# Patient Record
Sex: MALE | Race: OTHER | Employment: OTHER | URBAN - METROPOLITAN AREA
[De-identification: names, ages, dates, MRNs, and addresses within clinical notes are randomized per-mention and may not be internally consistent; named-entity substitution may affect disease eponyms.]

---

## 2022-08-03 ENCOUNTER — TELEPHONE (OUTPATIENT)
Dept: SURGERY | Facility: CLINIC | Age: 77
End: 2022-08-03

## 2022-08-03 NOTE — TELEPHONE ENCOUNTER
I called wife Vikram Velazquez and discussed that we have not received records. I verified office fax number and rec she speak to medical records to have it resent. I also discussed that she can have them mailed to their house as well. She asked if our patients do hyperbaric therapy and I discussed that we do refer patients for this. She verbalized understanding and all questions were answered.

## 2022-08-03 NOTE — TELEPHONE ENCOUNTER
Per wife asking if the office received the medical records sent about 3 weeks ago by [facility].  Please advise

## 2022-08-06 ENCOUNTER — HOSPITAL ENCOUNTER (INPATIENT)
Facility: HOSPITAL | Age: 77
LOS: 20 days | Discharge: HOME | End: 2022-08-27
Attending: EMERGENCY MEDICINE | Admitting: HOSPITALIST
Payer: MEDICARE

## 2022-08-06 ENCOUNTER — HOSPITAL ENCOUNTER (INPATIENT)
Facility: HOSPITAL | Age: 77
LOS: 20 days | Discharge: HOME HEALTH | DRG: 654 | End: 2022-08-27
Attending: EMERGENCY MEDICINE | Admitting: HOSPITALIST
Payer: MEDICARE

## 2022-08-06 DIAGNOSIS — R33.9 URINARY RETENTION: ICD-10-CM

## 2022-08-06 DIAGNOSIS — R31.9 URINARY TRACT INFECTION WITH HEMATURIA, SITE UNSPECIFIED: ICD-10-CM

## 2022-08-06 DIAGNOSIS — R31.0 GROSS HEMATURIA: Primary | ICD-10-CM

## 2022-08-06 DIAGNOSIS — N39.0 URINARY TRACT INFECTION WITH HEMATURIA, SITE UNSPECIFIED: ICD-10-CM

## 2022-08-06 LAB
BILIRUB UR QL STRIP.AUTO: NEGATIVE
GLUCOSE UR STRIP.AUTO-MCNC: NEGATIVE MG/DL
KETONES UR STRIP.AUTO-MCNC: NEGATIVE MG/DL
NITRITE UR QL STRIP.AUTO: NEGATIVE
PH UR STRIP.AUTO: 6.5 [PH] (ref 5–8)
PROT UR STRIP.AUTO-MCNC: >=300 MG/DL
RBC #/AREA URNS AUTO: >10 /HPF
SP GR UR STRIP.AUTO: 1.02 (ref 1–1.03)
UROBILINOGEN UR STRIP.AUTO-MCNC: 0.2 MG/DL
WBC #/AREA URNS AUTO: >50 /HPF
WBC CLUMPS UR QL AUTO: PRESENT /HPF

## 2022-08-06 RX ORDER — LIDOCAINE HYDROCHLORIDE 20 MG/ML
JELLY TOPICAL
Status: COMPLETED
Start: 2022-08-06 | End: 2022-08-06

## 2022-08-06 RX ORDER — LIDOCAINE HYDROCHLORIDE 20 MG/ML
10 JELLY TOPICAL ONCE
Status: COMPLETED | OUTPATIENT
Start: 2022-08-06 | End: 2022-08-06

## 2022-08-06 RX ORDER — ACETAMINOPHEN 500 MG
1000 TABLET ORAL ONCE
Status: COMPLETED | OUTPATIENT
Start: 2022-08-06 | End: 2022-08-07

## 2022-08-07 PROBLEM — N30.40 RADIATION CYSTITIS: Status: ACTIVE | Noted: 2022-08-07

## 2022-08-07 PROBLEM — R33.9 URINARY RETENTION: Status: ACTIVE | Noted: 2022-08-07

## 2022-08-07 PROBLEM — N39.0 URINARY TRACT INFECTION WITH HEMATURIA, SITE UNSPECIFIED: Status: ACTIVE | Noted: 2022-08-07

## 2022-08-07 PROBLEM — R31.9 URINARY TRACT INFECTION WITH HEMATURIA, SITE UNSPECIFIED: Status: ACTIVE | Noted: 2022-08-07

## 2022-08-07 PROBLEM — R31.0 GROSS HEMATURIA: Status: ACTIVE | Noted: 2022-08-07

## 2022-08-07 LAB
ALBUMIN SERPL-MCNC: 2.3 G/DL (ref 3.4–5)
ALBUMIN/GLOB SERPL: 0.7 {RATIO} (ref 1–2)
ALP LIVER SERPL-CCNC: 62 U/L
ALT SERPL-CCNC: 18 U/L
ANION GAP SERPL CALC-SCNC: 7 MMOL/L (ref 0–18)
ANION GAP SERPL CALC-SCNC: 7 MMOL/L (ref 0–18)
APTT PPP: 29.6 SECONDS (ref 23.3–35.6)
AST SERPL-CCNC: 24 U/L (ref 15–37)
BASOPHILS # BLD AUTO: 0.06 X10(3) UL (ref 0–0.2)
BASOPHILS NFR BLD AUTO: 0.4 %
BILIRUB SERPL-MCNC: 0.4 MG/DL (ref 0.1–2)
BUN BLD-MCNC: 32 MG/DL (ref 7–18)
BUN BLD-MCNC: 36 MG/DL (ref 7–18)
CALCIUM BLD-MCNC: 7.6 MG/DL (ref 8.5–10.1)
CALCIUM BLD-MCNC: 8.5 MG/DL (ref 8.5–10.1)
CHLORIDE SERPL-SCNC: 106 MMOL/L (ref 98–112)
CHLORIDE SERPL-SCNC: 110 MMOL/L (ref 98–112)
CO2 SERPL-SCNC: 20 MMOL/L (ref 21–32)
CO2 SERPL-SCNC: 21 MMOL/L (ref 21–32)
CREAT BLD-MCNC: 1.8 MG/DL
CREAT BLD-MCNC: 1.84 MG/DL
EOSINOPHIL # BLD AUTO: 0.05 X10(3) UL (ref 0–0.7)
EOSINOPHIL NFR BLD AUTO: 0.3 %
ERYTHROCYTE [DISTWIDTH] IN BLOOD BY AUTOMATED COUNT: 14.1 %
ERYTHROCYTE [DISTWIDTH] IN BLOOD BY AUTOMATED COUNT: 14.3 %
GFR SERPLBLD BASED ON 1.73 SQ M-ARVRAT: 38 ML/MIN/1.73M2 (ref 60–?)
GFR SERPLBLD BASED ON 1.73 SQ M-ARVRAT: 39 ML/MIN/1.73M2 (ref 60–?)
GLOBULIN PLAS-MCNC: 3.4 G/DL (ref 2.8–4.4)
GLUCOSE BLD-MCNC: 120 MG/DL (ref 70–99)
GLUCOSE BLD-MCNC: 127 MG/DL (ref 70–99)
HCT VFR BLD AUTO: 22.6 %
HCT VFR BLD AUTO: 28.5 %
HGB BLD-MCNC: 7.3 G/DL
HGB BLD-MCNC: 9.5 G/DL
IMM GRANULOCYTES # BLD AUTO: 0.11 X10(3) UL (ref 0–1)
IMM GRANULOCYTES NFR BLD: 0.7 %
INR BLD: 1 (ref 0.85–1.16)
LACTATE SERPL-SCNC: 1 MMOL/L (ref 0.4–2)
LYMPHOCYTES # BLD AUTO: 0.69 X10(3) UL (ref 1–4)
LYMPHOCYTES NFR BLD AUTO: 4.1 %
MCH RBC QN AUTO: 30 PG (ref 26–34)
MCH RBC QN AUTO: 30.8 PG (ref 26–34)
MCHC RBC AUTO-ENTMCNC: 32.3 G/DL (ref 31–37)
MCHC RBC AUTO-ENTMCNC: 33.3 G/DL (ref 31–37)
MCV RBC AUTO: 92.5 FL
MCV RBC AUTO: 93 FL
MONOCYTES # BLD AUTO: 0.83 X10(3) UL (ref 0.1–1)
MONOCYTES NFR BLD AUTO: 5 %
NEUTROPHILS # BLD AUTO: 14.9 X10 (3) UL (ref 1.5–7.7)
NEUTROPHILS # BLD AUTO: 14.9 X10(3) UL (ref 1.5–7.7)
NEUTROPHILS NFR BLD AUTO: 89.5 %
OSMOLALITY SERPL CALC.SUM OF ELEC: 288 MOSM/KG (ref 275–295)
OSMOLALITY SERPL CALC.SUM OF ELEC: 292 MOSM/KG (ref 275–295)
PLATELET # BLD AUTO: 195 10(3)UL (ref 150–450)
PLATELET # BLD AUTO: 239 10(3)UL (ref 150–450)
POTASSIUM SERPL-SCNC: 4.3 MMOL/L (ref 3.5–5.1)
POTASSIUM SERPL-SCNC: 4.8 MMOL/L (ref 3.5–5.1)
PROT SERPL-MCNC: 5.7 G/DL (ref 6.4–8.2)
PROTHROMBIN TIME: 13.2 SECONDS (ref 11.6–14.8)
RBC # BLD AUTO: 2.43 X10(6)UL
RBC # BLD AUTO: 3.08 X10(6)UL
SARS-COV-2 RNA RESP QL NAA+PROBE: NOT DETECTED
SODIUM SERPL-SCNC: 134 MMOL/L (ref 136–145)
SODIUM SERPL-SCNC: 137 MMOL/L (ref 136–145)
WBC # BLD AUTO: 13.2 X10(3) UL (ref 4–11)
WBC # BLD AUTO: 16.6 X10(3) UL (ref 4–11)

## 2022-08-07 PROCEDURE — 99223 1ST HOSP IP/OBS HIGH 75: CPT | Performed by: UROLOGY

## 2022-08-07 PROCEDURE — 99223 1ST HOSP IP/OBS HIGH 75: CPT | Performed by: STUDENT IN AN ORGANIZED HEALTH CARE EDUCATION/TRAINING PROGRAM

## 2022-08-07 RX ORDER — POLYETHYLENE GLYCOL 3350 17 G/17G
17 POWDER, FOR SOLUTION ORAL DAILY PRN
COMMUNITY

## 2022-08-07 RX ORDER — HYDROMORPHONE HYDROCHLORIDE 1 MG/ML
0.8 INJECTION, SOLUTION INTRAMUSCULAR; INTRAVENOUS; SUBCUTANEOUS EVERY 2 HOUR PRN
Status: DISCONTINUED | OUTPATIENT
Start: 2022-08-07 | End: 2022-08-27

## 2022-08-07 RX ORDER — OXYBUTYNIN CHLORIDE 5 MG/1
5 TABLET, EXTENDED RELEASE ORAL DAILY
Status: DISCONTINUED | OUTPATIENT
Start: 2022-08-07 | End: 2022-08-09

## 2022-08-07 RX ORDER — HYDROMORPHONE HYDROCHLORIDE 1 MG/ML
0.2 INJECTION, SOLUTION INTRAMUSCULAR; INTRAVENOUS; SUBCUTANEOUS EVERY 2 HOUR PRN
Status: DISCONTINUED | OUTPATIENT
Start: 2022-08-07 | End: 2022-08-27

## 2022-08-07 RX ORDER — METOCLOPRAMIDE HYDROCHLORIDE 5 MG/ML
5 INJECTION INTRAMUSCULAR; INTRAVENOUS EVERY 8 HOURS PRN
Status: DISCONTINUED | OUTPATIENT
Start: 2022-08-07 | End: 2022-08-27

## 2022-08-07 RX ORDER — LIDOCAINE HYDROCHLORIDE 20 MG/ML
10 JELLY TOPICAL ONCE
Status: COMPLETED | OUTPATIENT
Start: 2022-08-07 | End: 2022-08-07

## 2022-08-07 RX ORDER — ACETAMINOPHEN 500 MG
1000 TABLET ORAL EVERY 6 HOURS PRN
COMMUNITY

## 2022-08-07 RX ORDER — ATORVASTATIN CALCIUM 20 MG/1
20 TABLET, FILM COATED ORAL DAILY
Status: DISCONTINUED | OUTPATIENT
Start: 2022-08-07 | End: 2022-08-27

## 2022-08-07 RX ORDER — ACETAMINOPHEN 500 MG
500 TABLET ORAL EVERY 4 HOURS PRN
Status: DISCONTINUED | OUTPATIENT
Start: 2022-08-07 | End: 2022-08-19

## 2022-08-07 RX ORDER — AMLODIPINE BESYLATE 10 MG/1
10 TABLET ORAL DAILY
COMMUNITY

## 2022-08-07 RX ORDER — ATORVASTATIN CALCIUM 20 MG/1
20 TABLET, FILM COATED ORAL DAILY
COMMUNITY

## 2022-08-07 RX ORDER — MELATONIN
3 NIGHTLY PRN
Status: DISCONTINUED | OUTPATIENT
Start: 2022-08-07 | End: 2022-08-27

## 2022-08-07 RX ORDER — MELATONIN
325
COMMUNITY

## 2022-08-07 RX ORDER — MELATONIN
325
Status: DISCONTINUED | OUTPATIENT
Start: 2022-08-08 | End: 2022-08-27

## 2022-08-07 RX ORDER — HYDROCODONE BITARTRATE AND ACETAMINOPHEN 5; 325 MG/1; MG/1
2 TABLET ORAL EVERY 4 HOURS PRN
Status: DISCONTINUED | OUTPATIENT
Start: 2022-08-07 | End: 2022-08-27

## 2022-08-07 RX ORDER — ATROPA BELLADONNA AND OPIUM 16.2; 6 MG/1; MG/1
60 SUPPOSITORY RECTAL EVERY 8 HOURS PRN
COMMUNITY

## 2022-08-07 RX ORDER — TROSPIUM CHLORIDE 20 MG/1
20 TABLET, FILM COATED ORAL DAILY
COMMUNITY
End: 2022-08-27

## 2022-08-07 RX ORDER — ACETAMINOPHEN 325 MG/1
650 TABLET ORAL EVERY 4 HOURS PRN
Status: DISCONTINUED | OUTPATIENT
Start: 2022-08-07 | End: 2022-08-19

## 2022-08-07 RX ORDER — PAROXETINE HYDROCHLORIDE 20 MG/1
10 TABLET, FILM COATED ORAL EVERY MORNING
COMMUNITY
End: 2022-08-27

## 2022-08-07 RX ORDER — PAROXETINE HYDROCHLORIDE 20 MG/1
20 TABLET, FILM COATED ORAL EVERY MORNING
COMMUNITY
Start: 2022-08-09

## 2022-08-07 RX ORDER — HYDROMORPHONE HYDROCHLORIDE 1 MG/ML
0.4 INJECTION, SOLUTION INTRAMUSCULAR; INTRAVENOUS; SUBCUTANEOUS EVERY 2 HOUR PRN
Status: DISCONTINUED | OUTPATIENT
Start: 2022-08-07 | End: 2022-08-27

## 2022-08-07 RX ORDER — ATROPA BELLADONNA AND OPIUM 16.2; 6 MG/1; MG/1
60 SUPPOSITORY RECTAL EVERY 6 HOURS PRN
Status: DISCONTINUED | OUTPATIENT
Start: 2022-08-07 | End: 2022-08-27

## 2022-08-07 RX ORDER — ONDANSETRON 2 MG/ML
4 INJECTION INTRAMUSCULAR; INTRAVENOUS EVERY 6 HOURS PRN
Status: DISCONTINUED | OUTPATIENT
Start: 2022-08-07 | End: 2022-08-27

## 2022-08-07 RX ORDER — HYDROCODONE BITARTRATE AND ACETAMINOPHEN 5; 325 MG/1; MG/1
1 TABLET ORAL EVERY 4 HOURS PRN
Status: DISCONTINUED | OUTPATIENT
Start: 2022-08-07 | End: 2022-08-27

## 2022-08-07 RX ORDER — BACITRACIN ZINC 500 [USP'U]/G
1 OINTMENT TOPICAL 3 TIMES DAILY PRN
COMMUNITY

## 2022-08-07 RX ORDER — TRAZODONE HYDROCHLORIDE 100 MG/1
100 TABLET ORAL NIGHTLY
COMMUNITY

## 2022-08-07 RX ORDER — POLYETHYLENE GLYCOL 3350 17 G/17G
17 POWDER, FOR SOLUTION ORAL DAILY PRN
Status: DISCONTINUED | OUTPATIENT
Start: 2022-08-07 | End: 2022-08-27

## 2022-08-07 RX ORDER — LIDOCAINE HYDROCHLORIDE 20 MG/ML
JELLY TOPICAL 3 TIMES DAILY PRN
COMMUNITY

## 2022-08-07 RX ORDER — MAGNESIUM HYDROXIDE 1200 MG/15ML
3000 LIQUID ORAL CONTINUOUS
Status: DISCONTINUED | OUTPATIENT
Start: 2022-08-07 | End: 2022-08-27

## 2022-08-07 RX ORDER — SIMETHICONE 80 MG
80 TABLET,CHEWABLE ORAL
COMMUNITY

## 2022-08-07 RX ORDER — SODIUM CHLORIDE 9 MG/ML
INJECTION, SOLUTION INTRAVENOUS CONTINUOUS
Status: DISCONTINUED | OUTPATIENT
Start: 2022-08-07 | End: 2022-08-19

## 2022-08-07 RX ORDER — SODIUM PHOSPHATE, DIBASIC AND SODIUM PHOSPHATE, MONOBASIC 7; 19 G/133ML; G/133ML
1 ENEMA RECTAL ONCE AS NEEDED
Status: DISCONTINUED | OUTPATIENT
Start: 2022-08-07 | End: 2022-08-27

## 2022-08-07 RX ORDER — ATROPA BELLADONNA AND OPIUM 16.2; 6 MG/1; MG/1
60 SUPPOSITORY RECTAL EVERY 8 HOURS PRN
Status: DISCONTINUED | OUTPATIENT
Start: 2022-08-07 | End: 2022-08-07

## 2022-08-07 RX ORDER — BISACODYL 10 MG
10 SUPPOSITORY, RECTAL RECTAL
Status: DISCONTINUED | OUTPATIENT
Start: 2022-08-07 | End: 2022-08-27

## 2022-08-07 RX ORDER — PAROXETINE 10 MG/1
10 TABLET, FILM COATED ORAL EVERY MORNING
Status: DISCONTINUED | OUTPATIENT
Start: 2022-08-07 | End: 2022-08-27

## 2022-08-07 RX ORDER — SENNOSIDES 8.6 MG
17.2 TABLET ORAL NIGHTLY PRN
Status: DISCONTINUED | OUTPATIENT
Start: 2022-08-07 | End: 2022-08-27

## 2022-08-07 RX ORDER — HYDROMORPHONE HYDROCHLORIDE 1 MG/ML
0.5 INJECTION, SOLUTION INTRAMUSCULAR; INTRAVENOUS; SUBCUTANEOUS ONCE
Status: COMPLETED | OUTPATIENT
Start: 2022-08-07 | End: 2022-08-07

## 2022-08-07 RX ORDER — SIMETHICONE 80 MG
80 TABLET,CHEWABLE ORAL
Status: DISCONTINUED | OUTPATIENT
Start: 2022-08-07 | End: 2022-08-27

## 2022-08-07 RX ORDER — SODIUM CHLORIDE 9 MG/ML
INJECTION, SOLUTION INTRAVENOUS CONTINUOUS
Status: ACTIVE | OUTPATIENT
Start: 2022-08-07 | End: 2022-08-07

## 2022-08-07 RX ORDER — TRAZODONE HYDROCHLORIDE 100 MG/1
100 TABLET ORAL NIGHTLY
Status: DISCONTINUED | OUTPATIENT
Start: 2022-08-07 | End: 2022-08-27

## 2022-08-07 RX ORDER — AMLODIPINE BESYLATE 5 MG/1
10 TABLET ORAL DAILY
Status: DISCONTINUED | OUTPATIENT
Start: 2022-08-07 | End: 2022-08-27

## 2022-08-07 RX ORDER — DOCUSATE SODIUM 100 MG/1
100 CAPSULE, LIQUID FILLED ORAL 2 TIMES DAILY PRN
COMMUNITY

## 2022-08-07 NOTE — PROGRESS NOTES
NURSING ADMISSION NOTE      Patient admitted via Cart  Oriented to room. Safety precautions initiated. Bed in low position. Call light in reach. Alert and orientated. Oxygen WNL on room air, . No complaints of pain, medications given per MAR.  CBI punch colored, minimal clots

## 2022-08-07 NOTE — ED INITIAL ASSESSMENT (HPI)
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  discntinued last wednesday

## 2022-08-07 NOTE — PROGRESS NOTES
Assumed care at 1300. A/Ox4. R/A, NSR:ST on tele. Hypertensive on receiving patient in my care and severe pain. IV dilaudid and rena jamilah suppository given w/good relief. BP's came down. CBI running peach to light red. Nephrostomy tubes accessed w/standard bags. R nephrostomy with good output, Moderately bloody. L nephrostomy with minimal output and light yellow. Up with SBA. Zofran for N/V after dilaudid w/solid relief. .[email or web address]@[email or web address]. Staff will continue to monitor.

## 2022-08-07 NOTE — ED QUICK NOTES
Canada cath removed, (+) bloody output with clots. Attempted to insert a Fr 22 3 way canada cath, unsuccessful. A 3way Fr20 canada cath inserted aseptically per policy with a return of bloody urine with clots. CBI initiated. Canada cath also manually irrigated due to presence of moderate size clots. Able to manually irrigate canada cath smoothly with NS, no resistance noted. Pt tolerated well, states he has no pain. Pt's wife remains at bedside.  Dr Mulu Sr informed of this

## 2022-08-07 NOTE — ED QUICK NOTES
Orders for admission, patient is aware of plan and ready to go upstairs.  Any questions, please call ED RN Herman Krishna at extension [phone]    Patient Covid vaccination status: Fully vaccinated     COVID Test Ordered in ED: Rapid SARS-CoV-2 by PCR    COVID Suspicion at Admission: Low clinical suspicion for COVID    Running Infusions:      Mental Status/LOC at time of transport: A/Ox4    Other pertinent information:   CIWA score: N/A   NIH score:  N/A

## 2022-08-08 ENCOUNTER — APPOINTMENT (OUTPATIENT)
Dept: INTERVENTIONAL RADIOLOGY/VASCULAR | Facility: HOSPITAL | Age: 77
End: 2022-08-08
Attending: UROLOGY
Payer: MEDICARE

## 2022-08-08 ENCOUNTER — TELEPHONE (OUTPATIENT)
Dept: SURGERY | Facility: CLINIC | Age: 77
End: 2022-08-08

## 2022-08-08 ENCOUNTER — APPOINTMENT (OUTPATIENT)
Dept: INTERVENTIONAL RADIOLOGY/VASCULAR | Facility: HOSPITAL | Age: 77
DRG: 654 | End: 2022-08-08
Attending: UROLOGY
Payer: MEDICARE

## 2022-08-08 LAB
ANION GAP SERPL CALC-SCNC: 6 MMOL/L (ref 0–18)
ANTIBODY SCREEN: NEGATIVE
BUN BLD-MCNC: 29 MG/DL (ref 7–18)
CALCIUM BLD-MCNC: 7.4 MG/DL (ref 8.5–10.1)
CHLORIDE SERPL-SCNC: 111 MMOL/L (ref 98–112)
CO2 SERPL-SCNC: 19 MMOL/L (ref 21–32)
CREAT BLD-MCNC: 1.44 MG/DL
ERYTHROCYTE [DISTWIDTH] IN BLOOD BY AUTOMATED COUNT: 14.5 %
GFR SERPLBLD BASED ON 1.73 SQ M-ARVRAT: 51 ML/MIN/1.73M2 (ref 60–?)
GLUCOSE BLD-MCNC: 101 MG/DL (ref 70–99)
HCT VFR BLD AUTO: 18.5 %
HGB BLD-MCNC: 6.1 G/DL
HGB BLD-MCNC: 7.4 G/DL
MCH RBC QN AUTO: 30.7 PG (ref 26–34)
MCHC RBC AUTO-ENTMCNC: 33 G/DL (ref 31–37)
MCV RBC AUTO: 93 FL
OSMOLALITY SERPL CALC.SUM OF ELEC: 288 MOSM/KG (ref 275–295)
PLATELET # BLD AUTO: 133 10(3)UL (ref 150–450)
POTASSIUM SERPL-SCNC: 4.4 MMOL/L (ref 3.5–5.1)
RBC # BLD AUTO: 1.99 X10(6)UL
RH BLOOD TYPE: NEGATIVE
SODIUM SERPL-SCNC: 136 MMOL/L (ref 136–145)
WBC # BLD AUTO: 11.9 X10(3) UL (ref 4–11)

## 2022-08-08 PROCEDURE — 99233 SBSQ HOSP IP/OBS HIGH 50: CPT | Performed by: HOSPITALIST

## 2022-08-08 PROCEDURE — 30233N1 TRANSFUSION OF NONAUTOLOGOUS RED BLOOD CELLS INTO PERIPHERAL VEIN, PERCUTANEOUS APPROACH: ICD-10-PCS | Performed by: HOSPITALIST

## 2022-08-08 PROCEDURE — 99231 SBSQ HOSP IP/OBS SF/LOW 25: CPT | Performed by: UROLOGY

## 2022-08-08 RX ORDER — ACETAMINOPHEN 325 MG/1
650 TABLET ORAL ONCE
Status: COMPLETED | OUTPATIENT
Start: 2022-08-08 | End: 2022-08-08

## 2022-08-08 RX ORDER — LIDOCAINE HYDROCHLORIDE 10 MG/ML
INJECTION, SOLUTION INFILTRATION; PERINEURAL
Status: COMPLETED
Start: 2022-08-08 | End: 2022-08-08

## 2022-08-08 RX ORDER — VANCOMYCIN/0.9 % SOD CHLORIDE 1.75 G/5
25 PLASTIC BAG, INJECTION (ML) INTRAVENOUS ONCE
Status: COMPLETED | OUTPATIENT
Start: 2022-08-08 | End: 2022-08-08

## 2022-08-08 RX ORDER — SODIUM CHLORIDE 9 MG/ML
INJECTION, SOLUTION INTRAVENOUS ONCE
Status: COMPLETED | OUTPATIENT
Start: 2022-08-08 | End: 2022-08-08

## 2022-08-08 RX ORDER — SODIUM CHLORIDE 9 MG/ML
INJECTION, SOLUTION INTRAVENOUS ONCE
Status: DISCONTINUED | OUTPATIENT
Start: 2022-08-08 | End: 2022-08-27

## 2022-08-08 RX ORDER — MIDAZOLAM HYDROCHLORIDE 1 MG/ML
INJECTION INTRAMUSCULAR; INTRAVENOUS
Status: DISCONTINUED
Start: 2022-08-08 | End: 2022-08-08 | Stop reason: WASHOUT

## 2022-08-08 NOTE — PROGRESS NOTES
Spoke with urology resident Dr. Marilyn Townsend who knows the patient well and was involved in his treatment at the [facility]. With the patient's relapse with further bleeding and retention he concurs that the patient should receive further care which we cannot deliver here locally and would recommend referral of the patient back to the [facility] from where he was just discharged 2 days ago.  is working on transfer.

## 2022-08-08 NOTE — CM/SW NOTE
Spoke to Guru at the transfer center in [facility] ([phone]) who states that they are on a medical bed diversion at this time. She is not sure when they will have a bed. She will reach out to the Urology team though and reach out to our hospitalist as well. She will call back with any updates. Sent them a facesheet at this time.

## 2022-08-08 NOTE — PROGRESS NOTES
Assumed care at 0700, A/Ox4, R/A. Up with SBA. 1 unit PRBC's transfused for hgb 6.1. Patient tolerated well. Hgb redraw 7.4. CBI. Draining light red. Left and right nephrostomy. Right output now dark tea colored compared to bloody yesterday. Left nephrostomy now has output after going to to IR, light yellow. Dilaudid and rena zaria x1 for pain w/solid relief. .[email or web address] SUMA denise. Case management working on possible transfer back to [facility] for cystectomy. Patient has expressed desire to remain at THE [facility] for care if neccessary until a procedure at [facility] is imminent due to logistical concerns. Staff will continue to monitor.

## 2022-08-08 NOTE — PROGRESS NOTES
Pt awake and alert. No tele. On RA. Hernández draining red output. To continuous irrigation. Not clots seen. Pt requested Belladonna-opium suppository for bladder spasms. Denied pain. 0.9% NaCl @ 83. IV Rocephin. Max temp 100.6. Tylenol given.

## 2022-08-08 NOTE — CM/SW NOTE
Spoke to Guru again from [facility] who informed that we can try to call them tomorrow since Urology further reviewed the chart and they felt that the patient may very well need a Cystectomy. They are still on a bed diversion. Call back tomorrow to see bed availability at [facility].

## 2022-08-08 NOTE — CM/SW NOTE
Received a call from [facility] and informed that they are on a bed diversion at this time and the Urology team informed her that they would not do a cystectomy right away either and if we need a transfer they would recommend us sending the patient to another facility in [place]. I informed Dr. Perfecto Vargas and she will speak to Urology but plan is for patient to stay here at this time.

## 2022-08-08 NOTE — TELEPHONE ENCOUNTER
Per pt's spouse, pt was seen today in the hospital by Dr. Shira Valero and would like to speak to MD regarding condition and to get update.  Please advise

## 2022-08-09 LAB
ANION GAP SERPL CALC-SCNC: 7 MMOL/L (ref 0–18)
BLOOD TYPE BARCODE: 600
BUN BLD-MCNC: 22 MG/DL (ref 7–18)
CALCIUM BLD-MCNC: 7.8 MG/DL (ref 8.5–10.1)
CHLORIDE SERPL-SCNC: 116 MMOL/L (ref 98–112)
CO2 SERPL-SCNC: 15 MMOL/L (ref 21–32)
CREAT BLD-MCNC: 1.24 MG/DL
CREAT BLD-MCNC: 1.32 MG/DL
ERYTHROCYTE [DISTWIDTH] IN BLOOD BY AUTOMATED COUNT: 15 %
GFR SERPLBLD BASED ON 1.73 SQ M-ARVRAT: 56 ML/MIN/1.73M2 (ref 60–?)
GFR SERPLBLD BASED ON 1.73 SQ M-ARVRAT: 61 ML/MIN/1.73M2 (ref 60–?)
GLUCOSE BLD-MCNC: 87 MG/DL (ref 70–99)
HCT VFR BLD AUTO: 21.6 %
HGB BLD-MCNC: 7 G/DL
HGB BLD-MCNC: 8.3 G/DL
MCH RBC QN AUTO: 29.9 PG (ref 26–34)
MCHC RBC AUTO-ENTMCNC: 32.4 G/DL (ref 31–37)
MCV RBC AUTO: 92.3 FL
OSMOLALITY SERPL CALC.SUM OF ELEC: 289 MOSM/KG (ref 275–295)
PLATELET # BLD AUTO: 124 10(3)UL (ref 150–450)
POTASSIUM SERPL-SCNC: 4.6 MMOL/L (ref 3.5–5.1)
RBC # BLD AUTO: 2.34 X10(6)UL
SODIUM SERPL-SCNC: 138 MMOL/L (ref 136–145)
WBC # BLD AUTO: 6.7 X10(3) UL (ref 4–11)

## 2022-08-09 PROCEDURE — 99233 SBSQ HOSP IP/OBS HIGH 50: CPT | Performed by: HOSPITALIST

## 2022-08-09 PROCEDURE — 99231 SBSQ HOSP IP/OBS SF/LOW 25: CPT | Performed by: UROLOGY

## 2022-08-09 RX ORDER — SODIUM CHLORIDE 9 MG/ML
INJECTION, SOLUTION INTRAVENOUS ONCE
Status: COMPLETED | OUTPATIENT
Start: 2022-08-09 | End: 2022-08-09

## 2022-08-09 RX ORDER — OXYBUTYNIN CHLORIDE 5 MG/1
10 TABLET, EXTENDED RELEASE ORAL DAILY
Status: DISCONTINUED | OUTPATIENT
Start: 2022-08-10 | End: 2022-08-27

## 2022-08-09 NOTE — CM/SW NOTE
MSW spoke to pt's and spouse. He does not want to go to [facility] when a bed is open then wait their for surgery. He wants to stay home or at [facility] until then day before the surgery and then go to [facility]. Pt asked MSW to call [facility] and find out if this is possible. MSW called [facility] [phone] and spoke to Social Work Department-they states for MSW to call [employer]. MSW called Transfer Center who states they are not in charge of scheduling when a patient will have surgery. Per [facility] he is not currently on their wait list and they are also on diversion at this time. Pt provided with # for patient experience [facility].

## 2022-08-09 NOTE — PLAN OF CARE
Patient alert and oriented x4. On RA. Denies pain at this time. Bilateral nephro tubes. R with red tinged output. L with yellow urine. CBI with red tinged urine. 1unit PRBCs running. IV abx. IVF. Resting comfortably in bed. WCTM. 1650:  Lab called with bc results. ID updated. 1745:  Pt concerned about leaking around canada. md notified. Orders for bladder scan, wtcm. 1830:  Bladder scan <70cc.

## 2022-08-10 LAB
BLOOD TYPE BARCODE: 600
CREAT BLD-MCNC: 1 MG/DL
ERYTHROCYTE [DISTWIDTH] IN BLOOD BY AUTOMATED COUNT: 15 %
GFR SERPLBLD BASED ON 1.73 SQ M-ARVRAT: 78 ML/MIN/1.73M2 (ref 60–?)
HCT VFR BLD AUTO: 26.5 %
HGB BLD-MCNC: 8.7 G/DL
MCH RBC QN AUTO: 29.8 PG (ref 26–34)
MCHC RBC AUTO-ENTMCNC: 32.8 G/DL (ref 31–37)
MCV RBC AUTO: 90.8 FL
PLATELET # BLD AUTO: 156 10(3)UL (ref 150–450)
RBC # BLD AUTO: 2.92 X10(6)UL
WBC # BLD AUTO: 4.3 X10(3) UL (ref 4–11)

## 2022-08-10 PROCEDURE — 99233 SBSQ HOSP IP/OBS HIGH 50: CPT | Performed by: HOSPITALIST

## 2022-08-10 NOTE — PROGRESS NOTES
Urine from nephrostomy tubes is now clear. Bladder irrigation is light pink. Patient has become frustrated dealing with the [facility] without a response for his future treatment plan. Would like to look elsewhere. We will see if any local consultants feel capable of handling this issue and/or tertiary care centers in the [facility]. In the meantime we will cap the nephrostomy tubes and discontinue CBI overnight and consider voiding trial if the urine is clear in the morning.

## 2022-08-10 NOTE — PLAN OF CARE
Patient alert and oriented x4. On RA. Bladder spasms at times. PRN belladonna. CBI, pink tinged urine. R and L nephro tubes with yellow urine. Wife at bedside. IV merrem, IV vanco.  Contact iso for ESBL. Plan for 2D echo. Nephro tubes clamped at 1700. CBI dc'd at 1700. Resting comfortably in bed. [facility].     1830:  shantel's group consulted as per jocelyn's request.

## 2022-08-10 NOTE — CM/SW NOTE
0928 Yaya Slade to check on bed availability, there are no beds available at this time    Transfer center at [facility] looking into case and will call me back    10:15 Merritt Colbert from the transfer center at [facility] said they cannot accept the patient at this time because they are on diversion. Merritt Colbert said to call back and check after 2pm today    Call placed to [facility] to see if transport will be available when bed available    Clinicals faxed to [facility] [phone]      Spoke with Barbara Wyman with [facility] who stated cost for transport to [facility] would be $15,000.  Barbara Serranoers stated insurance would not cover because it is not the closest hospital  Barbara Wyman [phone]  Barbara Senters would also need a days notice    Discussed with wife regarding cost, wife would like to keep the option to transfer to [facility]    Kristina Garcia, [address]  , [address] Hernandez Transitions Leader  [phone]

## 2022-08-10 NOTE — PROGRESS NOTES
Pharmacy Note:  Renal Adjustment for meropenem (MERREM)         Yolanda Madera is a 78year old male who has been prescribed meropenem 500mg q12hr. Est CrCl: >60 mL/min (NISSA on admission - resolved)    The dose has been adjusted to meropenem 500g q8hr per hospital renal dose adjustment protocol. Pharmacy will follow and adjust dose as warranted for renal function changes.     Thank you,      Laura New, PharmD, BCPS  8/10/2022  12:05 PM

## 2022-08-11 ENCOUNTER — APPOINTMENT (OUTPATIENT)
Dept: CV DIAGNOSTICS | Facility: HOSPITAL | Age: 77
End: 2022-08-11
Attending: INTERNAL MEDICINE
Payer: MEDICARE

## 2022-08-11 ENCOUNTER — APPOINTMENT (OUTPATIENT)
Dept: CV DIAGNOSTICS | Facility: HOSPITAL | Age: 77
End: 2022-08-11
Attending: PHYSICIAN ASSISTANT
Payer: MEDICARE

## 2022-08-11 ENCOUNTER — APPOINTMENT (OUTPATIENT)
Dept: CV DIAGNOSTICS | Facility: HOSPITAL | Age: 77
DRG: 654 | End: 2022-08-11
Attending: INTERNAL MEDICINE
Payer: MEDICARE

## 2022-08-11 ENCOUNTER — APPOINTMENT (OUTPATIENT)
Dept: CV DIAGNOSTICS | Facility: HOSPITAL | Age: 77
DRG: 654 | End: 2022-08-11
Attending: PHYSICIAN ASSISTANT
Payer: MEDICARE

## 2022-08-11 LAB
ANION GAP SERPL CALC-SCNC: 4 MMOL/L (ref 0–18)
BUN BLD-MCNC: 18 MG/DL (ref 7–18)
CALCIUM BLD-MCNC: 8.2 MG/DL (ref 8.5–10.1)
CHLORIDE SERPL-SCNC: 116 MMOL/L (ref 98–112)
CO2 SERPL-SCNC: 22 MMOL/L (ref 21–32)
CREAT BLD-MCNC: 1.05 MG/DL
CREAT BLD-MCNC: 1.05 MG/DL
ERYTHROCYTE [DISTWIDTH] IN BLOOD BY AUTOMATED COUNT: 14.7 %
GFR SERPLBLD BASED ON 1.73 SQ M-ARVRAT: 74 ML/MIN/1.73M2 (ref 60–?)
GFR SERPLBLD BASED ON 1.73 SQ M-ARVRAT: 74 ML/MIN/1.73M2 (ref 60–?)
GLUCOSE BLD-MCNC: 105 MG/DL (ref 70–99)
HCT VFR BLD AUTO: 24.8 %
HGB BLD-MCNC: 8 G/DL
MCH RBC QN AUTO: 30.7 PG (ref 26–34)
MCHC RBC AUTO-ENTMCNC: 32.3 G/DL (ref 31–37)
MCV RBC AUTO: 95 FL
OSMOLALITY SERPL CALC.SUM OF ELEC: 296 MOSM/KG (ref 275–295)
PLATELET # BLD AUTO: 143 10(3)UL (ref 150–450)
POTASSIUM SERPL-SCNC: 3.6 MMOL/L (ref 3.5–5.1)
RBC # BLD AUTO: 2.61 X10(6)UL
SODIUM SERPL-SCNC: 142 MMOL/L (ref 136–145)
VANCOMYCIN TROUGH SERPL-MCNC: 12.3 UG/ML (ref 10–20)
WBC # BLD AUTO: 4.6 X10(3) UL (ref 4–11)

## 2022-08-11 PROCEDURE — 93306 TTE W/DOPPLER COMPLETE: CPT | Performed by: PHYSICIAN ASSISTANT

## 2022-08-11 PROCEDURE — 99233 SBSQ HOSP IP/OBS HIGH 50: CPT | Performed by: HOSPITALIST

## 2022-08-11 RX ORDER — CIPROFLOXACIN 2 MG/ML
400 INJECTION, SOLUTION INTRAVENOUS EVERY 12 HOURS
Status: DISCONTINUED | OUTPATIENT
Start: 2022-08-11 | End: 2022-08-23

## 2022-08-11 NOTE — PROGRESS NOTES
I spoke with Dr. Julianne Oquendo yesterday and he agreed to see Mr. Leni Shea in consultation. I tried to alert the patient yesterday evening on the telephone but he did not answer the phone. Apparently Dr. Ej ambrosio and his associate did see Mr. Leni Shea early this morning and he has agreed to assume his care and is planning a surgical procedure for next week. Therefore I will sign off the routine urology care for Mr. Leni Shea at this time.

## 2022-08-11 NOTE — PROGRESS NOTES
[facility] Dosing Service    Follow-up Pharmacokinetic Consult for Vancomycin Dosing     Ros Ahmadi is a 78year old patient who is being treated for bacteremia and pyelonephritis . Patient is on day 4 of vancomycin and is currently receiving 1000 mg Q 24 hours. Labs:  Lab Results   Component Value Date    CREATSERUM 1.05 08/11/2022    CREATSERUM 1.05 08/11/2022      CrCl:  Estimated Creatinine Clearance: 58.8 mL/min (based on SCr of 1.05 mg/dL). Levels:  trough: 12.3 mcg/mL    Based on the above:    1. Continue Vancomycin at 1000 mg IVPB Q 24 hours based on pharmacokinetics and renal function. 2.  Will re-check vancomycin trough level(s) in 5-7 days. Goal trough is 10-15 mcg/mL unless otherwise noted by ordering provider. 3.  Pharmacy will order SCr as clinically indicated while on vancomycin to assess renal function. 4. Pharmacy will follow and monitor renal function, toxicity and efficacy. We appreciate the opportunity to assist in the care of this patient.     Ana Chavez, PharmD  8/11/2022  2:39 PM  [address] [place] [address] Extension: [phone]

## 2022-08-12 ENCOUNTER — APPOINTMENT (OUTPATIENT)
Dept: CV DIAGNOSTICS | Facility: HOSPITAL | Age: 77
End: 2022-08-12
Attending: INTERNAL MEDICINE
Payer: MEDICARE

## 2022-08-12 ENCOUNTER — APPOINTMENT (OUTPATIENT)
Dept: INTERVENTIONAL RADIOLOGY/VASCULAR | Facility: HOSPITAL | Age: 77
DRG: 654 | End: 2022-08-12
Attending: INTERNAL MEDICINE
Payer: MEDICARE

## 2022-08-12 ENCOUNTER — APPOINTMENT (OUTPATIENT)
Dept: INTERVENTIONAL RADIOLOGY/VASCULAR | Facility: HOSPITAL | Age: 77
End: 2022-08-12
Attending: INTERNAL MEDICINE
Payer: MEDICARE

## 2022-08-12 ENCOUNTER — APPOINTMENT (OUTPATIENT)
Dept: CV DIAGNOSTICS | Facility: HOSPITAL | Age: 77
DRG: 654 | End: 2022-08-12
Attending: INTERNAL MEDICINE
Payer: MEDICARE

## 2022-08-12 LAB
ATRIAL RATE: 72 BPM
CREAT BLD-MCNC: 1.2 MG/DL
E CLOAC COMP DNA BLD POS NAA+NON-PROBE: DETECTED
E CLOAC COMP DNA BLD POS NAA+NON-PROBE: DETECTED
ENTEROCOCCUS NO VANA/B: DETECTED
GFR SERPLBLD BASED ON 1.73 SQ M-ARVRAT: 63 ML/MIN/1.73M2 (ref 60–?)
P AXIS: 28 DEGREES
P-R INTERVAL: 150 MS
Q-T INTERVAL: 446 MS
QRS DURATION: 90 MS
QTC CALCULATION (BEZET): 488 MS
R AXIS: 60 DEGREES
T AXIS: 48 DEGREES
VENTRICULAR RATE: 72 BPM

## 2022-08-12 PROCEDURE — 93325 DOPPLER ECHO COLOR FLOW MAPG: CPT | Performed by: INTERNAL MEDICINE

## 2022-08-12 PROCEDURE — 93320 DOPPLER ECHO COMPLETE: CPT | Performed by: INTERNAL MEDICINE

## 2022-08-12 PROCEDURE — B246ZZ4 ULTRASONOGRAPHY OF RIGHT AND LEFT HEART, TRANSESOPHAGEAL: ICD-10-PCS | Performed by: INTERNAL MEDICINE

## 2022-08-12 PROCEDURE — 99233 SBSQ HOSP IP/OBS HIGH 50: CPT | Performed by: HOSPITALIST

## 2022-08-12 RX ORDER — MIDAZOLAM HYDROCHLORIDE 1 MG/ML
INJECTION INTRAMUSCULAR; INTRAVENOUS
Status: COMPLETED
Start: 2022-08-12 | End: 2022-08-12

## 2022-08-12 NOTE — PROCEDURES
Cardiology Transesophageal Echo Note    PRE-PROCEDURE DIAGNOSIS: Bacteremia    PROCEDURE: Transesophageal Echocardiogram.    SEDATION:   Topical spray x 1  Versed: 3 mg  Fentanyl: 75 mcg    I personally supervised the intravenous administration of   conscious sedation. This was performed with continuous   respiratory, hemodynamic, and electrocardiographic monitoring. Sedation was supervised from 11:08 AM - 11:26 AM, a total of 18 minutes. DESCRIPTION:   Informed consent was obtained after risks and benefits of the procedure were explained. Oral hurricaine spray was placed in the oropharynx. Conscious sedation was given. After adequate anesthesia, the HANNA probe was placed in the oropharynx with the esophagus being successfully intubated. Standard transgastric and multiplane views were obtained and available findings are as follows:    COMPLICATIONS: none    FINDINGS:   Overall normal LVSF without wall motion abnormalities. Chamber sizes were within normal limits. LVEF visually estimated at 60%  RV size and function normal.  Normal mitral, tricuspid and pulmonic valves were seen. Mild MR and trivial TR. No echocardiographic evidence of endocarditis or abscess. 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 Leaflet thickness and opening normal.  No evidence of vegetation or abscess. There is mild perivalvular aortic insufficiency at the aorto-mitral curtain. Adequate velocities were seen in the BENTLEY. There was no evidence for intracardiac mass or thrombus. There was no evidence for reversal of flow in the pulmonary veins. The aorta was evaluated. There was no evidence for mobile atheromata or thrombus.       Nancy Mathis MD  8/12/2022  9:58 AM

## 2022-08-12 NOTE — PROGRESS NOTES
LFA redenned edematous and tender. abx stopped. IV d/cd see iv assmt flowsheet. Iv restarted rt hand w  Blood return noted. 1126  HANNA completed. Pt tolerated without apparent distress. 1200 report given to [facility]. 12:30 pt to floor in stable condition.  Wife at bedside

## 2022-08-12 NOTE — CM/SW NOTE
08/12/22 1400   CM/SW Referral Data   Referral Source Social Work (self-referral)   Reason for Referral Discharge planning   Informant Patient;Spouse/Significant Other   Patient Status Prior to Admission   Independent with ADLs and Mobility Yes   MSW met with the patient and spouse at bedside to complete discharge planning assessment. The patient is alert and oriented and making his own decisions. The patient is a 78year old male who was completely independent and golfing 5x per week prior to admission. We discussed the need for [address] after surgery. He is agreeable. Referrals sent. Wife asked MSW how to get records from [facility] clinic to the hospital and would also like help setting up My Chart on their phones. MSW will ask CM in regards to records.     Duglas Leslie LCSW

## 2022-08-13 LAB
CREAT BLD-MCNC: 1.22 MG/DL
ERYTHROCYTE [DISTWIDTH] IN BLOOD BY AUTOMATED COUNT: 14.2 %
GFR SERPLBLD BASED ON 1.73 SQ M-ARVRAT: 62 ML/MIN/1.73M2 (ref 60–?)
HCT VFR BLD AUTO: 24.2 %
HGB BLD-MCNC: 7.9 G/DL
MCH RBC QN AUTO: 29.9 PG (ref 26–34)
MCHC RBC AUTO-ENTMCNC: 32.6 G/DL (ref 31–37)
MCV RBC AUTO: 91.7 FL
PLATELET # BLD AUTO: 161 10(3)UL (ref 150–450)
RBC # BLD AUTO: 2.64 X10(6)UL
WBC # BLD AUTO: 6.2 X10(3) UL (ref 4–11)

## 2022-08-13 PROCEDURE — 99233 SBSQ HOSP IP/OBS HIGH 50: CPT | Performed by: HOSPITALIST

## 2022-08-13 RX ORDER — LOSARTAN POTASSIUM 50 MG/1
50 TABLET ORAL DAILY
Status: DISCONTINUED | OUTPATIENT
Start: 2022-08-13 | End: 2022-08-27

## 2022-08-14 LAB
BASOPHILS # BLD AUTO: 0.01 X10(3) UL (ref 0–0.2)
BASOPHILS NFR BLD AUTO: 0.2 %
CREAT BLD-MCNC: 1.27 MG/DL
EOSINOPHIL # BLD AUTO: 0.24 X10(3) UL (ref 0–0.7)
EOSINOPHIL NFR BLD AUTO: 4 %
ERYTHROCYTE [DISTWIDTH] IN BLOOD BY AUTOMATED COUNT: 14 %
GFR SERPLBLD BASED ON 1.73 SQ M-ARVRAT: 59 ML/MIN/1.73M2 (ref 60–?)
HCT VFR BLD AUTO: 27 %
HGB BLD-MCNC: 8.4 G/DL
IMM GRANULOCYTES # BLD AUTO: 0.04 X10(3) UL (ref 0–1)
IMM GRANULOCYTES NFR BLD: 0.7 %
LYMPHOCYTES # BLD AUTO: 1.14 X10(3) UL (ref 1–4)
LYMPHOCYTES NFR BLD AUTO: 19 %
MCH RBC QN AUTO: 29.3 PG (ref 26–34)
MCHC RBC AUTO-ENTMCNC: 31.1 G/DL (ref 31–37)
MCV RBC AUTO: 94.1 FL
MONOCYTES # BLD AUTO: 0.63 X10(3) UL (ref 0.1–1)
MONOCYTES NFR BLD AUTO: 10.5 %
NEUTROPHILS # BLD AUTO: 3.95 X10 (3) UL (ref 1.5–7.7)
NEUTROPHILS # BLD AUTO: 3.95 X10(3) UL (ref 1.5–7.7)
NEUTROPHILS NFR BLD AUTO: 65.6 %
PLATELET # BLD AUTO: 211 10(3)UL (ref 150–450)
RBC # BLD AUTO: 2.87 X10(6)UL
WBC # BLD AUTO: 6 X10(3) UL (ref 4–11)

## 2022-08-14 PROCEDURE — 99233 SBSQ HOSP IP/OBS HIGH 50: CPT | Performed by: HOSPITALIST

## 2022-08-15 ENCOUNTER — APPOINTMENT (OUTPATIENT)
Dept: CV DIAGNOSTICS | Facility: HOSPITAL | Age: 77
DRG: 654 | End: 2022-08-15
Payer: MEDICARE

## 2022-08-15 ENCOUNTER — ANESTHESIA EVENT (OUTPATIENT)
Dept: SURGERY | Facility: HOSPITAL | Age: 77
End: 2022-08-15
Payer: MEDICARE

## 2022-08-15 ENCOUNTER — APPOINTMENT (OUTPATIENT)
Dept: CV DIAGNOSTICS | Facility: HOSPITAL | Age: 77
End: 2022-08-15
Payer: MEDICARE

## 2022-08-15 LAB
CREAT BLD-MCNC: 1.36 MG/DL
ERYTHROCYTE [DISTWIDTH] IN BLOOD BY AUTOMATED COUNT: 13.8 %
GFR SERPLBLD BASED ON 1.73 SQ M-ARVRAT: 54 ML/MIN/1.73M2 (ref 60–?)
HCT VFR BLD AUTO: 24 %
HGB BLD-MCNC: 7.6 G/DL
MCH RBC QN AUTO: 29.7 PG (ref 26–34)
MCHC RBC AUTO-ENTMCNC: 31.7 G/DL (ref 31–37)
MCV RBC AUTO: 93.8 FL
PLATELET # BLD AUTO: 235 10(3)UL (ref 150–450)
RBC # BLD AUTO: 2.56 X10(6)UL
SARS-COV-2 RNA RESP QL NAA+PROBE: NOT DETECTED
WBC # BLD AUTO: 4.9 X10(3) UL (ref 4–11)

## 2022-08-15 PROCEDURE — 99233 SBSQ HOSP IP/OBS HIGH 50: CPT | Performed by: HOSPITALIST

## 2022-08-15 RX ORDER — MAGNESIUM CARB/ALUMINUM HYDROX 105-160MG
296 TABLET,CHEWABLE ORAL ONCE
Status: COMPLETED | OUTPATIENT
Start: 2022-08-15 | End: 2022-08-15

## 2022-08-15 RX ORDER — FUROSEMIDE 10 MG/ML
20 INJECTION INTRAMUSCULAR; INTRAVENOUS ONCE
Status: COMPLETED | OUTPATIENT
Start: 2022-08-15 | End: 2022-08-15

## 2022-08-15 RX ORDER — ACETAMINOPHEN 500 MG
1000 TABLET ORAL ONCE
Status: COMPLETED | OUTPATIENT
Start: 2022-08-16 | End: 2022-08-16

## 2022-08-15 NOTE — CM/SW NOTE
MSW met with the patient and wife at bedside and provided the [facility] choice list.  Wife will talk to her friend who is a PA for recommendations. Patient's wife is also concerned that the patient needs a prior auth for surgery. They have been getting a lot of medical bills which she showed me and one of them was not covered resulting in a $115,000 bill. She is very nervous about coverage at this point.   MSW reached out to CM's to assist.    Hector Mackay LCSW

## 2022-08-15 NOTE — CM/SW NOTE
Care Progression Note:  Length of stay: 8  GMLOS: 3.3  Avoidable Delays:   Code Status:     Acute Medical Issue/Factors:   Gross hematuria       Discharge Barriers: to OR tomorrow for cysto with ileal conduit urinary diversion  Expected discharge date: TBD  Expected next site of care: TBD    / available for discharge planning.

## 2022-08-16 ENCOUNTER — ANESTHESIA (OUTPATIENT)
Dept: SURGERY | Facility: HOSPITAL | Age: 77
End: 2022-08-16
Payer: MEDICARE

## 2022-08-16 LAB
ANION GAP SERPL CALC-SCNC: 7 MMOL/L (ref 0–18)
ANTIBODY SCREEN: NEGATIVE
APTT PPP: 30.8 SECONDS (ref 23.3–35.6)
BASOPHILS # BLD AUTO: 0.01 X10(3) UL (ref 0–0.2)
BASOPHILS NFR BLD AUTO: 0.2 %
BUN BLD-MCNC: 13 MG/DL (ref 7–18)
CALCIUM BLD-MCNC: 7.3 MG/DL (ref 8.5–10.1)
CHLORIDE SERPL-SCNC: 111 MMOL/L (ref 98–112)
CO2 SERPL-SCNC: 22 MMOL/L (ref 21–32)
CREAT BLD-MCNC: 1.4 MG/DL
CREAT BLD-MCNC: 1.46 MG/DL
EOSINOPHIL # BLD AUTO: 0.07 X10(3) UL (ref 0–0.7)
EOSINOPHIL NFR BLD AUTO: 1.1 %
ERYTHROCYTE [DISTWIDTH] IN BLOOD BY AUTOMATED COUNT: 13.2 %
ERYTHROCYTE [DISTWIDTH] IN BLOOD BY AUTOMATED COUNT: 13.6 %
GFR SERPLBLD BASED ON 1.73 SQ M-ARVRAT: 50 ML/MIN/1.73M2 (ref 60–?)
GFR SERPLBLD BASED ON 1.73 SQ M-ARVRAT: 52 ML/MIN/1.73M2 (ref 60–?)
GLUCOSE BLD-MCNC: 130 MG/DL (ref 70–99)
HCT VFR BLD AUTO: 19.4 %
HCT VFR BLD AUTO: 26.6 %
HGB BLD-MCNC: 6.5 G/DL
HGB BLD-MCNC: 9.1 G/DL
IMM GRANULOCYTES # BLD AUTO: 0.04 X10(3) UL (ref 0–1)
IMM GRANULOCYTES NFR BLD: 0.6 %
INR BLD: 1.18 (ref 0.85–1.16)
LYMPHOCYTES # BLD AUTO: 0.66 X10(3) UL (ref 1–4)
LYMPHOCYTES NFR BLD AUTO: 10.3 %
MCH RBC QN AUTO: 29.8 PG (ref 26–34)
MCH RBC QN AUTO: 30.4 PG (ref 26–34)
MCHC RBC AUTO-ENTMCNC: 33.5 G/DL (ref 31–37)
MCHC RBC AUTO-ENTMCNC: 34.2 G/DL (ref 31–37)
MCV RBC AUTO: 89 FL
MCV RBC AUTO: 89 FL
MONOCYTES # BLD AUTO: 0.22 X10(3) UL (ref 0.1–1)
MONOCYTES NFR BLD AUTO: 3.4 %
NEUTROPHILS # BLD AUTO: 5.4 X10 (3) UL (ref 1.5–7.7)
NEUTROPHILS # BLD AUTO: 5.4 X10(3) UL (ref 1.5–7.7)
NEUTROPHILS NFR BLD AUTO: 84.4 %
OSMOLALITY SERPL CALC.SUM OF ELEC: 292 MOSM/KG (ref 275–295)
PLATELET # BLD AUTO: 212 10(3)UL (ref 150–450)
PLATELET # BLD AUTO: 231 10(3)UL (ref 150–450)
POTASSIUM SERPL-SCNC: 4 MMOL/L (ref 3.5–5.1)
PROTHROMBIN TIME: 15 SECONDS (ref 11.6–14.8)
RBC # BLD AUTO: 2.18 X10(6)UL
RBC # BLD AUTO: 2.99 X10(6)UL
RH BLOOD TYPE: NEGATIVE
SODIUM SERPL-SCNC: 140 MMOL/L (ref 136–145)
WBC # BLD AUTO: 4.1 X10(3) UL (ref 4–11)
WBC # BLD AUTO: 6.4 X10(3) UL (ref 4–11)

## 2022-08-16 PROCEDURE — 99233 SBSQ HOSP IP/OBS HIGH 50: CPT | Performed by: HOSPITALIST

## 2022-08-16 PROCEDURE — 0T184ZC BYPASS BILATERAL URETERS TO ILEOCUTANEOUS, PERCUTANEOUS ENDOSCOPIC APPROACH: ICD-10-PCS | Performed by: UROLOGY

## 2022-08-16 PROCEDURE — 3E0T3BZ INTRODUCTION OF ANESTHETIC AGENT INTO PERIPHERAL NERVES AND PLEXI, PERCUTANEOUS APPROACH: ICD-10-PCS | Performed by: UROLOGY

## 2022-08-16 PROCEDURE — 8E0W4CZ ROBOTIC ASSISTED PROCEDURE OF TRUNK REGION, PERCUTANEOUS ENDOSCOPIC APPROACH: ICD-10-PCS | Performed by: UROLOGY

## 2022-08-16 PROCEDURE — 0TTB4ZZ RESECTION OF BLADDER, PERCUTANEOUS ENDOSCOPIC APPROACH: ICD-10-PCS | Performed by: UROLOGY

## 2022-08-16 PROCEDURE — 76942 ECHO GUIDE FOR BIOPSY: CPT | Performed by: INTERNAL MEDICINE

## 2022-08-16 RX ORDER — SODIUM CHLORIDE 9 MG/ML
INJECTION, SOLUTION INTRAVENOUS CONTINUOUS PRN
Status: DISCONTINUED | OUTPATIENT
Start: 2022-08-16 | End: 2022-08-16 | Stop reason: SURG

## 2022-08-16 RX ORDER — ACETAMINOPHEN 500 MG
1000 TABLET ORAL ONCE AS NEEDED
Status: DISCONTINUED | OUTPATIENT
Start: 2022-08-16 | End: 2022-08-16 | Stop reason: HOSPADM

## 2022-08-16 RX ORDER — NALOXONE HYDROCHLORIDE 0.4 MG/ML
80 INJECTION, SOLUTION INTRAMUSCULAR; INTRAVENOUS; SUBCUTANEOUS AS NEEDED
Status: DISCONTINUED | OUTPATIENT
Start: 2022-08-16 | End: 2022-08-16 | Stop reason: HOSPADM

## 2022-08-16 RX ORDER — BUPIVACAINE HYDROCHLORIDE 2.5 MG/ML
INJECTION, SOLUTION EPIDURAL; INFILTRATION; INTRACAUDAL AS NEEDED
Status: DISCONTINUED | OUTPATIENT
Start: 2022-08-16 | End: 2022-08-16 | Stop reason: SURG

## 2022-08-16 RX ORDER — ONDANSETRON 2 MG/ML
4 INJECTION INTRAMUSCULAR; INTRAVENOUS EVERY 6 HOURS PRN
Status: DISCONTINUED | OUTPATIENT
Start: 2022-08-16 | End: 2022-08-16 | Stop reason: HOSPADM

## 2022-08-16 RX ORDER — ROCURONIUM BROMIDE 10 MG/ML
INJECTION, SOLUTION INTRAVENOUS AS NEEDED
Status: DISCONTINUED | OUTPATIENT
Start: 2022-08-16 | End: 2022-08-16 | Stop reason: SURG

## 2022-08-16 RX ORDER — CEFAZOLIN SODIUM 1 G/3ML
INJECTION, POWDER, FOR SOLUTION INTRAMUSCULAR; INTRAVENOUS AS NEEDED
Status: DISCONTINUED | OUTPATIENT
Start: 2022-08-16 | End: 2022-08-16 | Stop reason: SURG

## 2022-08-16 RX ORDER — HYDROMORPHONE HYDROCHLORIDE 1 MG/ML
0.4 INJECTION, SOLUTION INTRAMUSCULAR; INTRAVENOUS; SUBCUTANEOUS EVERY 5 MIN PRN
Status: DISCONTINUED | OUTPATIENT
Start: 2022-08-16 | End: 2022-08-16 | Stop reason: HOSPADM

## 2022-08-16 RX ORDER — INDOCYANINE GREEN AND WATER 25 MG
KIT INJECTION AS NEEDED
Status: DISCONTINUED | OUTPATIENT
Start: 2022-08-16 | End: 2022-08-16 | Stop reason: SURG

## 2022-08-16 RX ORDER — SODIUM CHLORIDE, SODIUM LACTATE, POTASSIUM CHLORIDE, CALCIUM CHLORIDE 600; 310; 30; 20 MG/100ML; MG/100ML; MG/100ML; MG/100ML
INJECTION, SOLUTION INTRAVENOUS CONTINUOUS
Status: DISCONTINUED | OUTPATIENT
Start: 2022-08-16 | End: 2022-08-16 | Stop reason: HOSPADM

## 2022-08-16 RX ORDER — KETAMINE HYDROCHLORIDE 50 MG/ML
INJECTION, SOLUTION, CONCENTRATE INTRAMUSCULAR; INTRAVENOUS AS NEEDED
Status: DISCONTINUED | OUTPATIENT
Start: 2022-08-16 | End: 2022-08-16 | Stop reason: SURG

## 2022-08-16 RX ORDER — HYDROCODONE BITARTRATE AND ACETAMINOPHEN 5; 325 MG/1; MG/1
2 TABLET ORAL ONCE AS NEEDED
Status: DISCONTINUED | OUTPATIENT
Start: 2022-08-16 | End: 2022-08-16 | Stop reason: HOSPADM

## 2022-08-16 RX ORDER — DEXAMETHASONE SODIUM PHOSPHATE 4 MG/ML
VIAL (ML) INJECTION AS NEEDED
Status: DISCONTINUED | OUTPATIENT
Start: 2022-08-16 | End: 2022-08-16 | Stop reason: SURG

## 2022-08-16 RX ORDER — LIDOCAINE HYDROCHLORIDE 10 MG/ML
INJECTION, SOLUTION EPIDURAL; INFILTRATION; INTRACAUDAL; PERINEURAL AS NEEDED
Status: DISCONTINUED | OUTPATIENT
Start: 2022-08-16 | End: 2022-08-16 | Stop reason: SURG

## 2022-08-16 RX ORDER — LIDOCAINE HYDROCHLORIDE ANHYDROUS AND DEXTROSE MONOHYDRATE .8; 5 G/100ML; G/100ML
INJECTION, SOLUTION INTRAVENOUS CONTINUOUS PRN
Status: DISCONTINUED | OUTPATIENT
Start: 2022-08-16 | End: 2022-08-16 | Stop reason: SURG

## 2022-08-16 RX ORDER — ONDANSETRON 2 MG/ML
INJECTION INTRAMUSCULAR; INTRAVENOUS AS NEEDED
Status: DISCONTINUED | OUTPATIENT
Start: 2022-08-16 | End: 2022-08-16 | Stop reason: SURG

## 2022-08-16 RX ORDER — METOCLOPRAMIDE HYDROCHLORIDE 5 MG/ML
10 INJECTION INTRAMUSCULAR; INTRAVENOUS EVERY 8 HOURS PRN
Status: DISCONTINUED | OUTPATIENT
Start: 2022-08-16 | End: 2022-08-16 | Stop reason: HOSPADM

## 2022-08-16 RX ORDER — HYDROMORPHONE HYDROCHLORIDE 1 MG/ML
INJECTION, SOLUTION INTRAMUSCULAR; INTRAVENOUS; SUBCUTANEOUS
Status: COMPLETED
Start: 2022-08-16 | End: 2022-08-16

## 2022-08-16 RX ORDER — LABETALOL HYDROCHLORIDE 5 MG/ML
5 INJECTION, SOLUTION INTRAVENOUS EVERY 5 MIN PRN
Status: DISCONTINUED | OUTPATIENT
Start: 2022-08-16 | End: 2022-08-16 | Stop reason: HOSPADM

## 2022-08-16 RX ORDER — HYDROMORPHONE HYDROCHLORIDE 1 MG/ML
0.2 INJECTION, SOLUTION INTRAMUSCULAR; INTRAVENOUS; SUBCUTANEOUS EVERY 5 MIN PRN
Status: DISCONTINUED | OUTPATIENT
Start: 2022-08-16 | End: 2022-08-16 | Stop reason: HOSPADM

## 2022-08-16 RX ORDER — ACETAMINOPHEN 325 MG/1
650 TABLET ORAL ONCE
Status: DISCONTINUED | OUTPATIENT
Start: 2022-08-16 | End: 2022-08-16 | Stop reason: HOSPADM

## 2022-08-16 RX ORDER — SODIUM CHLORIDE 9 MG/ML
INJECTION, SOLUTION INTRAVENOUS ONCE
Status: COMPLETED | OUTPATIENT
Start: 2022-08-16 | End: 2022-08-16

## 2022-08-16 RX ORDER — HYDROCODONE BITARTRATE AND ACETAMINOPHEN 5; 325 MG/1; MG/1
1 TABLET ORAL ONCE AS NEEDED
Status: DISCONTINUED | OUTPATIENT
Start: 2022-08-16 | End: 2022-08-16 | Stop reason: HOSPADM

## 2022-08-16 RX ORDER — HYDROMORPHONE HYDROCHLORIDE 1 MG/ML
0.6 INJECTION, SOLUTION INTRAMUSCULAR; INTRAVENOUS; SUBCUTANEOUS EVERY 5 MIN PRN
Status: DISCONTINUED | OUTPATIENT
Start: 2022-08-16 | End: 2022-08-16 | Stop reason: HOSPADM

## 2022-08-16 RX ADMIN — KETAMINE HYDROCHLORIDE 10 MG: 50 INJECTION, SOLUTION, CONCENTRATE INTRAMUSCULAR; INTRAVENOUS at 11:38:00

## 2022-08-16 RX ADMIN — SODIUM CHLORIDE: 9 INJECTION, SOLUTION INTRAVENOUS at 14:33:00

## 2022-08-16 RX ADMIN — ROCURONIUM BROMIDE 20 MG: 10 INJECTION, SOLUTION INTRAVENOUS at 14:06:00

## 2022-08-16 RX ADMIN — INDOCYANINE GREEN AND WATER 5 MG: 25 MG KIT INJECTION at 14:35:00

## 2022-08-16 RX ADMIN — ONDANSETRON 4 MG: 2 INJECTION INTRAMUSCULAR; INTRAVENOUS at 16:18:00

## 2022-08-16 RX ADMIN — ROCURONIUM BROMIDE 20 MG: 10 INJECTION, SOLUTION INTRAVENOUS at 12:30:00

## 2022-08-16 RX ADMIN — SODIUM CHLORIDE: 9 INJECTION, SOLUTION INTRAVENOUS at 13:06:00

## 2022-08-16 RX ADMIN — SODIUM CHLORIDE: 9 INJECTION, SOLUTION INTRAVENOUS at 14:32:00

## 2022-08-16 RX ADMIN — ROCURONIUM BROMIDE 50 MG: 10 INJECTION, SOLUTION INTRAVENOUS at 11:36:00

## 2022-08-16 RX ADMIN — LIDOCAINE HYDROCHLORIDE ANHYDROUS AND DEXTROSE MONOHYDRATE 2 MG/KG/HR: .8; 5 INJECTION, SOLUTION INTRAVENOUS at 11:41:00

## 2022-08-16 RX ADMIN — SODIUM CHLORIDE: 9 INJECTION, SOLUTION INTRAVENOUS at 11:31:00

## 2022-08-16 RX ADMIN — SODIUM CHLORIDE: 9 INJECTION, SOLUTION INTRAVENOUS at 11:26:00

## 2022-08-16 RX ADMIN — SODIUM CHLORIDE: 9 INJECTION, SOLUTION INTRAVENOUS at 14:31:00

## 2022-08-16 RX ADMIN — CEFAZOLIN SODIUM 2 G: 1 INJECTION, POWDER, FOR SOLUTION INTRAMUSCULAR; INTRAVENOUS at 15:40:00

## 2022-08-16 RX ADMIN — DEXAMETHASONE SODIUM PHOSPHATE 8 MG: 4 MG/ML VIAL (ML) INJECTION at 11:41:00

## 2022-08-16 RX ADMIN — LIDOCAINE HYDROCHLORIDE 140 MG: 10 INJECTION, SOLUTION EPIDURAL; INFILTRATION; INTRACAUDAL; PERINEURAL at 11:36:00

## 2022-08-16 RX ADMIN — CEFAZOLIN SODIUM 2 G: 1 INJECTION, POWDER, FOR SOLUTION INTRAMUSCULAR; INTRAVENOUS at 11:40:00

## 2022-08-16 RX ADMIN — BUPIVACAINE HYDROCHLORIDE 50 ML: 2.5 INJECTION, SOLUTION EPIDURAL; INFILTRATION; INTRACAUDAL at 16:18:00

## 2022-08-16 NOTE — ANESTHESIA PROCEDURE NOTES
Airway  Date/Time: 8/16/2022 11:38 AM  Urgency: elective    Airway not difficult    General Information and Staff    Patient location during procedure: OR  Anesthesiologist: Nupur Clarke MD  Performed: anesthesiologist     Indications and Patient Condition  Indications for airway management: anesthesia  Spontaneous Ventilation: absent  Sedation level: deep  Preoxygenated: yes  Patient position: sniffing  Mask difficulty assessment: 1 - vent by mask    Final Airway Details  Final airway type: endotracheal airway      Successful airway: ETT  Cuffed: yes   Successful intubation technique: direct laryngoscopy  Endotracheal tube insertion site: oral  Blade: Faith  Blade size: #3  ETT size (mm): 7.5    Cormack-Lehane Classification: grade IIA - partial view of glottis  Placement verified by: chest auscultation and capnometry   Cuff volume (mL): 6  Measured from: lips  Number of attempts at approach: 1

## 2022-08-16 NOTE — ANESTHESIA PROCEDURE NOTES
Peripheral IV  Date/Time: 8/16/2022 11:32 AM  Inserted by: Marco A Coulter MD    Placement  Needle size: 18 G  Laterality: left  Location: hand  Local anesthetic: none  Site prep: alcohol  Technique: anatomical landmarks  Attempts: 1

## 2022-08-16 NOTE — ANESTHESIA PROCEDURE NOTES
Arterial Line  Performed by: Manjula Carballo MD  Authorized by: Manjula Carballo MD     General Information and Staff    Procedure Start:  8/16/2022 11:46 AM  Procedure End:  8/16/2022 11:50 AM  Anesthesiologist:  Manjula Carballo MD  Performed By:  Anesthesiologist  Patient Location:  OR  Indication: continuous blood pressure monitoring and blood sampling needed    Site Identification: surface landmarks and image stored and retrievable    Preanesthetic Checklist: 2 patient identifiers, IV checked, risks and benefits discussed, monitors and equipment checked, pre-op evaluation, timeout performed, anesthesia consent and sterile technique used    Procedure Details    Catheter Size:  20 G  Catheter Length:  1 and 3/4 inchCatheter Type:  Arrow  Seldinger Technique?: Yes    Laterality:  LeftSite:  Radial artery  Site Prep: chlorhexidine  Line Secured:  Wrist Brace, tape and Tegaderm    Assessment    Events: patient tolerated procedure well with no complications      Medications      Additional Comments

## 2022-08-16 NOTE — PROGRESS NOTES
Pre-op Diagnosis: Radiation induced hemorrhagic cystitis, hematuria with clot retention     The patient was examined and no significant changes have occurred in the patient's condition since I saw him a few days ago. I discussed with the patient and/or legal representative the potential benefits, risks and side effects of this procedure; the likelihood of the patient achieving goals; and potential problems that might occur during recuperation. I discussed reasonable alternatives to the procedure, including risks, benefits and side effects related to the alternatives and risks related to not receiving this procedure. We will proceed with procedure as planned.     Radha Hung MD   8/16/22

## 2022-08-16 NOTE — ANESTHESIA PROCEDURE NOTES
Regional Block  Performed by: Homer Gonsalves MD  Authorized by: Homer Gonsalves MD       General Information and Staff    Start Time:  8/16/2022 4:17 PM  End Time:  8/16/2022 4:27 PM  Anesthesiologist:  Homer Gonsalves MD  Performed by: Anesthesiologist  Patient Location:  OR    Block Placement: Post Induction  Site Identification: real time ultrasound guided and image stored and retrievable    Block site/laterality marked before start: site marked  Reason for Block: at surgeon's request and post-op pain management    Preanesthetic Checklist: 2 patient identifers, IV checked, risks and benefits discussed, monitors and equipment checked, pre-op evaluation, timeout performed, anesthesia consent, sterile technique used, no prohibitive neurological deficits and no local skin infection at insertion site      Procedure Details    Patient Position:  Supine  Prep: ChloraPrep    Monitoring:  Cardiac monitor, continuous pulse ox and blood pressure cuff  Block Type:  TAP  Laterality:  Bilateral  Injection Technique:  Single-shot    Needle    Needle Type:  Short-bevel and echogenic  Needle Localization:  Ultrasound guidance  Reason for Ultrasound Use: appropriate spread of the medication was noted in real time and no ultrasound evidence of intravascular and/or intraneural injection            Assessment    Injection Assessment:  Good spread noted, negative resistance, negative aspiration for heme, incremental injection and low pressure  Heart Rate Change: No    - Patient tolerated block procedure well without evidence of immediate block related complications.      Medications      Additional Comments    Medication:  Bupivacaine 0.25% 50 ml

## 2022-08-16 NOTE — ANESTHESIA PROCEDURE NOTES
Peripheral IV  Inserted by: Franklin Wright MD    Placement  Needle size: 20 G  Laterality: right  Location: hand  Local anesthetic: none  Site prep: alcohol  Technique: anatomical landmarks  Attempts: 1

## 2022-08-16 NOTE — PLAN OF CARE
Assumed patient care for AM shift:  - Surgery planned for this AM at 1100. - Low hemoglobin yesterday, redraw this AM and 1 Unit PRBC ordered today  - Type and screen pending, then will start unit of blood, consent signed and on chart  - Consent for procedure signed and on chart already  - NPO this AM, IV antibiotics given   - Up with SBA  - EVELYN nephrostomy tubes  - Three way canada catheter present, CBI is clamped  - Urine was yellow straw colored this AM    1045:  - blood started prior to going down to OR holding. Patient escorted down by this RN.

## 2022-08-16 NOTE — PROGRESS NOTES
Received patient from PACU. Patient restarted on floor IVF. Pain 9/10, PRN dilaudid given per MAR. CHI drain emptied for 5 mL. Urine draining appropriately. Family at bedside.

## 2022-08-17 LAB
ANION GAP SERPL CALC-SCNC: 5 MMOL/L (ref 0–18)
BASOPHILS # BLD AUTO: 0.01 X10(3) UL (ref 0–0.2)
BASOPHILS NFR BLD AUTO: 0.1 %
BLOOD TYPE BARCODE: 600
BUN BLD-MCNC: 16 MG/DL (ref 7–18)
CALCIUM BLD-MCNC: 7.3 MG/DL (ref 8.5–10.1)
CHLORIDE SERPL-SCNC: 113 MMOL/L (ref 98–112)
CO2 SERPL-SCNC: 21 MMOL/L (ref 21–32)
CREAT BLD-MCNC: 1.43 MG/DL
CREAT BLD-MCNC: 1.46 MG/DL
EOSINOPHIL # BLD AUTO: 0 X10(3) UL (ref 0–0.7)
EOSINOPHIL NFR BLD AUTO: 0 %
ERYTHROCYTE [DISTWIDTH] IN BLOOD BY AUTOMATED COUNT: 13.4 %
GFR SERPLBLD BASED ON 1.73 SQ M-ARVRAT: 50 ML/MIN/1.73M2 (ref 60–?)
GFR SERPLBLD BASED ON 1.73 SQ M-ARVRAT: 51 ML/MIN/1.73M2 (ref 60–?)
GLUCOSE BLD-MCNC: 112 MG/DL (ref 70–99)
HCT VFR BLD AUTO: 27.4 %
HGB BLD-MCNC: 9.2 G/DL
IMM GRANULOCYTES # BLD AUTO: 0.04 X10(3) UL (ref 0–1)
IMM GRANULOCYTES NFR BLD: 0.4 %
LYMPHOCYTES # BLD AUTO: 0.86 X10(3) UL (ref 1–4)
LYMPHOCYTES NFR BLD AUTO: 9.5 %
MCH RBC QN AUTO: 30 PG (ref 26–34)
MCHC RBC AUTO-ENTMCNC: 33.6 G/DL (ref 31–37)
MCV RBC AUTO: 89.3 FL
MONOCYTES # BLD AUTO: 0.72 X10(3) UL (ref 0.1–1)
MONOCYTES NFR BLD AUTO: 7.9 %
NEUTROPHILS # BLD AUTO: 7.44 X10 (3) UL (ref 1.5–7.7)
NEUTROPHILS # BLD AUTO: 7.44 X10(3) UL (ref 1.5–7.7)
NEUTROPHILS NFR BLD AUTO: 82.1 %
OSMOLALITY SERPL CALC.SUM OF ELEC: 290 MOSM/KG (ref 275–295)
PLATELET # BLD AUTO: 245 10(3)UL (ref 150–450)
POTASSIUM SERPL-SCNC: 4.4 MMOL/L (ref 3.5–5.1)
RBC # BLD AUTO: 3.07 X10(6)UL
SODIUM SERPL-SCNC: 139 MMOL/L (ref 136–145)
WBC # BLD AUTO: 9.1 X10(3) UL (ref 4–11)

## 2022-08-17 PROCEDURE — 99233 SBSQ HOSP IP/OBS HIGH 50: CPT | Performed by: HOSPITALIST

## 2022-08-17 NOTE — CM/SW NOTE
08/17/22 1125   Choice of Post-Acute Provider   Informed patient of right to choose their preferred provider Yes   List of appropriate post-acute services provided to patient/family with quality data Yes   Patient/family choice [id number] given to Patient;Spouse/Significant other     CM met with pt and spouse to follow up on [facility] choice. Pt and spouse elected [facility]. Referral reserved in [place]. Pt's spouse stated that the believes the pt will not be discharged until next week. Pt's spouse asked CM to assist with setting up [employer] on her phone, however she did not have all of the information with her at the time of conversation. Pt's spouse stated that she will be back tomorrow and will ask for assistance then if she is still having trouble logging in. Help number on the [employer] montse was also shared with spouse. CM/SW to remain available for further discharge planning.     CASTILLO TeeN, RN-BC    [id number]

## 2022-08-18 LAB
ANION GAP SERPL CALC-SCNC: 6 MMOL/L (ref 0–18)
BASOPHILS # BLD AUTO: 0 X10(3) UL (ref 0–0.2)
BASOPHILS NFR BLD AUTO: 0 %
BUN BLD-MCNC: 13 MG/DL (ref 7–18)
CALCIUM BLD-MCNC: 7.7 MG/DL (ref 8.5–10.1)
CHLORIDE SERPL-SCNC: 109 MMOL/L (ref 98–112)
CO2 SERPL-SCNC: 22 MMOL/L (ref 21–32)
CREAT BLD-MCNC: 1.26 MG/DL
CREAT FLD-MCNC: 1.19 MG/DL
EOSINOPHIL # BLD AUTO: 0 X10(3) UL (ref 0–0.7)
EOSINOPHIL NFR BLD AUTO: 0 %
ERYTHROCYTE [DISTWIDTH] IN BLOOD BY AUTOMATED COUNT: 13.4 %
GFR SERPLBLD BASED ON 1.73 SQ M-ARVRAT: 59 ML/MIN/1.73M2 (ref 60–?)
GLUCOSE BLD-MCNC: 105 MG/DL (ref 70–99)
HCT VFR BLD AUTO: 16.9 %
HCT VFR BLD AUTO: 30.2 %
HGB BLD-MCNC: 10 G/DL
HGB BLD-MCNC: 5.4 G/DL
IMM GRANULOCYTES # BLD AUTO: 0.05 X10(3) UL (ref 0–1)
IMM GRANULOCYTES NFR BLD: 0.7 %
LYMPHOCYTES # BLD AUTO: 0.45 X10(3) UL (ref 1–4)
LYMPHOCYTES NFR BLD AUTO: 6.5 %
MCH RBC QN AUTO: 30 PG (ref 26–34)
MCHC RBC AUTO-ENTMCNC: 32 G/DL (ref 31–37)
MCV RBC AUTO: 93.9 FL
MONOCYTES # BLD AUTO: 0.44 X10(3) UL (ref 0.1–1)
MONOCYTES NFR BLD AUTO: 6.3 %
NEUTROPHILS # BLD AUTO: 6.01 X10 (3) UL (ref 1.5–7.7)
NEUTROPHILS # BLD AUTO: 6.01 X10(3) UL (ref 1.5–7.7)
NEUTROPHILS NFR BLD AUTO: 86.5 %
OSMOLALITY SERPL CALC.SUM OF ELEC: 284 MOSM/KG (ref 275–295)
PLATELET # BLD AUTO: 138 10(3)UL (ref 150–450)
POTASSIUM SERPL-SCNC: 4.1 MMOL/L (ref 3.5–5.1)
RBC # BLD AUTO: 1.8 X10(6)UL
SODIUM SERPL-SCNC: 137 MMOL/L (ref 136–145)
WBC # BLD AUTO: 7 X10(3) UL (ref 4–11)

## 2022-08-18 PROCEDURE — 99233 SBSQ HOSP IP/OBS HIGH 50: CPT | Performed by: HOSPITALIST

## 2022-08-18 NOTE — CM/SW NOTE
Sw received call from [facility] from [facility]. They can service pt under the Medicare COVDI waiver for the IV abs so there would be no co-pays of the ABs but he would have to agree to use one of their contracted [facility] agencies   Khadar Daniel is not contracted). [facility] is one of their contracted [facility] agencies. She will send referral to [facility] in case IV abs are needed. If no IV abs needed, will continue with plan for [facility] for [facility].     Ashwin Fisher LCSW  /Discharge Planner  [phone]

## 2022-08-18 NOTE — CM/SW NOTE
PT/OT did evals and advise [facility] PT/OT. Sent updated order for [facility] [place] PT/[facility]/RN to [facility].       Per ID note, end of IV ab treatment should be 8/22      Paul Barrera LCSW  /Discharge Planner  [phone]'

## 2022-08-18 NOTE — CM/SW NOTE
SW sent referral to [facility] in case pt needs IV abs at [place]. Plan was for two weeks total of IV abs.       CYDNEY AmandaW  /Discharge Planner  [phone]

## 2022-08-19 ENCOUNTER — APPOINTMENT (OUTPATIENT)
Dept: GENERAL RADIOLOGY | Facility: HOSPITAL | Age: 77
End: 2022-08-19
Attending: PHYSICIAN ASSISTANT
Payer: MEDICARE

## 2022-08-19 ENCOUNTER — APPOINTMENT (OUTPATIENT)
Dept: GENERAL RADIOLOGY | Facility: HOSPITAL | Age: 77
DRG: 654 | End: 2022-08-19
Attending: PHYSICIAN ASSISTANT
Payer: MEDICARE

## 2022-08-19 LAB
ANION GAP SERPL CALC-SCNC: 6 MMOL/L (ref 0–18)
BASOPHILS # BLD AUTO: 0.01 X10(3) UL (ref 0–0.2)
BASOPHILS NFR BLD AUTO: 0.1 %
BLOOD TYPE BARCODE: 600
BUN BLD-MCNC: 14 MG/DL (ref 7–18)
CALCIUM BLD-MCNC: 7.8 MG/DL (ref 8.5–10.1)
CHLORIDE SERPL-SCNC: 107 MMOL/L (ref 98–112)
CO2 SERPL-SCNC: 21 MMOL/L (ref 21–32)
CREAT BLD-MCNC: 1.29 MG/DL
CREAT BLD-MCNC: 1.29 MG/DL
EOSINOPHIL # BLD AUTO: 0.01 X10(3) UL (ref 0–0.7)
EOSINOPHIL NFR BLD AUTO: 0.1 %
ERYTHROCYTE [DISTWIDTH] IN BLOOD BY AUTOMATED COUNT: 13.2 %
GFR SERPLBLD BASED ON 1.73 SQ M-ARVRAT: 58 ML/MIN/1.73M2 (ref 60–?)
GFR SERPLBLD BASED ON 1.73 SQ M-ARVRAT: 58 ML/MIN/1.73M2 (ref 60–?)
GLUCOSE BLD-MCNC: 111 MG/DL (ref 70–99)
HCT VFR BLD AUTO: 34.7 %
HGB BLD-MCNC: 11.6 G/DL
IMM GRANULOCYTES # BLD AUTO: 0.11 X10(3) UL (ref 0–1)
IMM GRANULOCYTES NFR BLD: 0.7 %
LYMPHOCYTES # BLD AUTO: 1.03 X10(3) UL (ref 1–4)
LYMPHOCYTES NFR BLD AUTO: 6.7 %
MCH RBC QN AUTO: 29.6 PG (ref 26–34)
MCHC RBC AUTO-ENTMCNC: 33.4 G/DL (ref 31–37)
MCV RBC AUTO: 88.5 FL
MONOCYTES # BLD AUTO: 0.88 X10(3) UL (ref 0.1–1)
MONOCYTES NFR BLD AUTO: 5.8 %
NEUTROPHILS # BLD AUTO: 13.25 X10 (3) UL (ref 1.5–7.7)
NEUTROPHILS # BLD AUTO: 13.25 X10(3) UL (ref 1.5–7.7)
NEUTROPHILS NFR BLD AUTO: 86.6 %
OSMOLALITY SERPL CALC.SUM OF ELEC: 279 MOSM/KG (ref 275–295)
PLATELET # BLD AUTO: 329 10(3)UL (ref 150–450)
POTASSIUM SERPL-SCNC: 4.3 MMOL/L (ref 3.5–5.1)
RBC # BLD AUTO: 3.92 X10(6)UL
SODIUM SERPL-SCNC: 134 MMOL/L (ref 136–145)
WBC # BLD AUTO: 15.3 X10(3) UL (ref 4–11)

## 2022-08-19 PROCEDURE — 74019 RADEX ABDOMEN 2 VIEWS: CPT | Performed by: PHYSICIAN ASSISTANT

## 2022-08-19 PROCEDURE — 99232 SBSQ HOSP IP/OBS MODERATE 35: CPT | Performed by: HOSPITALIST

## 2022-08-19 PROCEDURE — 71045 X-RAY EXAM CHEST 1 VIEW: CPT | Performed by: PHYSICIAN ASSISTANT

## 2022-08-19 RX ORDER — DEXTROSE AND SODIUM CHLORIDE 5; .45 G/100ML; G/100ML
INJECTION, SOLUTION INTRAVENOUS CONTINUOUS
Status: DISCONTINUED | OUTPATIENT
Start: 2022-08-19 | End: 2022-08-27

## 2022-08-19 RX ORDER — ACETAMINOPHEN 10 MG/ML
1000 INJECTION, SOLUTION INTRAVENOUS EVERY 6 HOURS PRN
Status: DISCONTINUED | OUTPATIENT
Start: 2022-08-19 | End: 2022-08-24

## 2022-08-19 NOTE — CM/SW NOTE
[facility] updated that pt anticipated to be here though the weekend and may finish IV abs while here.     Andria Fuentes LCSW  /Discharge Planner  [phone]

## 2022-08-19 NOTE — PROGRESS NOTES
A/O x 4  Room air  Pt had an episode of emesis with dilaudid- emesis brown in color, with some bile noted  Tolerating CLD  Given Zofran with relief but patient is still complaining of feeling sick to his stomach  CHI drain with large serosanginous output  Urostomy with good output- some red tinge noted in urine  Up with 1-2 assist  Pain seems better controlled  Will continue to monitor

## 2022-08-19 NOTE — CM/SW NOTE
Care Progression Note:  Length of stay: 12  GMLOS: 5.3  Avoidable Delays:   Code Status:     Acute Medical Issue/Factors:   Gross hematuria       Discharge Barriers: abdominal pain, ngt inserted, abdominal xrays to r/o ileus / obstruction  Expected discharge date: TBD  Expected next site of care: [facility]. / available for discharge planning.

## 2022-08-20 LAB
ANION GAP SERPL CALC-SCNC: 3 MMOL/L (ref 0–18)
BASOPHILS # BLD AUTO: 0.02 X10(3) UL (ref 0–0.2)
BASOPHILS NFR BLD AUTO: 0.1 %
BUN BLD-MCNC: 15 MG/DL (ref 7–18)
CALCIUM BLD-MCNC: 8.1 MG/DL (ref 8.5–10.1)
CHLORIDE SERPL-SCNC: 106 MMOL/L (ref 98–112)
CO2 SERPL-SCNC: 30 MMOL/L (ref 21–32)
CREAT BLD-MCNC: 1.23 MG/DL
CREAT BLD-MCNC: 1.23 MG/DL
EOSINOPHIL # BLD AUTO: 0.13 X10(3) UL (ref 0–0.7)
EOSINOPHIL NFR BLD AUTO: 1 %
ERYTHROCYTE [DISTWIDTH] IN BLOOD BY AUTOMATED COUNT: 13.2 %
GFR SERPLBLD BASED ON 1.73 SQ M-ARVRAT: 61 ML/MIN/1.73M2 (ref 60–?)
GFR SERPLBLD BASED ON 1.73 SQ M-ARVRAT: 61 ML/MIN/1.73M2 (ref 60–?)
GLUCOSE BLD-MCNC: 118 MG/DL (ref 70–99)
HCT VFR BLD AUTO: 31.7 %
HGB BLD-MCNC: 10.6 G/DL
IMM GRANULOCYTES # BLD AUTO: 0.07 X10(3) UL (ref 0–1)
IMM GRANULOCYTES NFR BLD: 0.5 %
LYMPHOCYTES # BLD AUTO: 1 X10(3) UL (ref 1–4)
LYMPHOCYTES NFR BLD AUTO: 7.3 %
MCH RBC QN AUTO: 29.6 PG (ref 26–34)
MCHC RBC AUTO-ENTMCNC: 33.4 G/DL (ref 31–37)
MCV RBC AUTO: 88.5 FL
MONOCYTES # BLD AUTO: 1.02 X10(3) UL (ref 0.1–1)
MONOCYTES NFR BLD AUTO: 7.5 %
NEUTROPHILS # BLD AUTO: 11.38 X10 (3) UL (ref 1.5–7.7)
NEUTROPHILS # BLD AUTO: 11.38 X10(3) UL (ref 1.5–7.7)
NEUTROPHILS NFR BLD AUTO: 83.6 %
OSMOLALITY SERPL CALC.SUM OF ELEC: 290 MOSM/KG (ref 275–295)
PLATELET # BLD AUTO: 295 10(3)UL (ref 150–450)
POTASSIUM SERPL-SCNC: 3.2 MMOL/L (ref 3.5–5.1)
RBC # BLD AUTO: 3.58 X10(6)UL
SODIUM SERPL-SCNC: 139 MMOL/L (ref 136–145)
WBC # BLD AUTO: 13.6 X10(3) UL (ref 4–11)

## 2022-08-20 PROCEDURE — 99233 SBSQ HOSP IP/OBS HIGH 50: CPT | Performed by: HOSPITALIST

## 2022-08-20 RX ORDER — BISACODYL 10 MG
10 SUPPOSITORY, RECTAL RECTAL DAILY
Status: DISCONTINUED | OUTPATIENT
Start: 2022-08-20 | End: 2022-08-27

## 2022-08-20 RX ORDER — HYDRALAZINE HYDROCHLORIDE 20 MG/ML
10 INJECTION INTRAMUSCULAR; INTRAVENOUS EVERY 4 HOURS PRN
Status: DISCONTINUED | OUTPATIENT
Start: 2022-08-20 | End: 2022-08-27

## 2022-08-21 LAB
ANION GAP SERPL CALC-SCNC: 3 MMOL/L (ref 0–18)
BASOPHILS # BLD AUTO: 0.03 X10(3) UL (ref 0–0.2)
BASOPHILS NFR BLD AUTO: 0.3 %
BUN BLD-MCNC: 14 MG/DL (ref 7–18)
CALCIUM BLD-MCNC: 7.7 MG/DL (ref 8.5–10.1)
CHLORIDE SERPL-SCNC: 110 MMOL/L (ref 98–112)
CO2 SERPL-SCNC: 29 MMOL/L (ref 21–32)
CREAT BLD-MCNC: 1.19 MG/DL
CREAT BLD-MCNC: 1.19 MG/DL
EOSINOPHIL # BLD AUTO: 0.18 X10(3) UL (ref 0–0.7)
EOSINOPHIL NFR BLD AUTO: 1.7 %
ERYTHROCYTE [DISTWIDTH] IN BLOOD BY AUTOMATED COUNT: 13.2 %
GFR SERPLBLD BASED ON 1.73 SQ M-ARVRAT: 64 ML/MIN/1.73M2 (ref 60–?)
GFR SERPLBLD BASED ON 1.73 SQ M-ARVRAT: 64 ML/MIN/1.73M2 (ref 60–?)
GLUCOSE BLD-MCNC: 109 MG/DL (ref 70–99)
HCT VFR BLD AUTO: 26.9 %
HGB BLD-MCNC: 8.9 G/DL
IMM GRANULOCYTES # BLD AUTO: 0.06 X10(3) UL (ref 0–1)
IMM GRANULOCYTES NFR BLD: 0.6 %
LYMPHOCYTES # BLD AUTO: 1.07 X10(3) UL (ref 1–4)
LYMPHOCYTES NFR BLD AUTO: 10 %
MAGNESIUM SERPL-MCNC: 1.7 MG/DL (ref 1.6–2.6)
MCH RBC QN AUTO: 29.4 PG (ref 26–34)
MCHC RBC AUTO-ENTMCNC: 33.1 G/DL (ref 31–37)
MCV RBC AUTO: 88.8 FL
MONOCYTES # BLD AUTO: 1.04 X10(3) UL (ref 0.1–1)
MONOCYTES NFR BLD AUTO: 9.7 %
NEUTROPHILS # BLD AUTO: 8.36 X10 (3) UL (ref 1.5–7.7)
NEUTROPHILS # BLD AUTO: 8.36 X10(3) UL (ref 1.5–7.7)
NEUTROPHILS NFR BLD AUTO: 77.7 %
OSMOLALITY SERPL CALC.SUM OF ELEC: 295 MOSM/KG (ref 275–295)
PHOSPHATE SERPL-MCNC: 2.3 MG/DL (ref 2.5–4.9)
PLATELET # BLD AUTO: 251 10(3)UL (ref 150–450)
POTASSIUM SERPL-SCNC: 3.1 MMOL/L (ref 3.5–5.1)
POTASSIUM SERPL-SCNC: 3.1 MMOL/L (ref 3.5–5.1)
RBC # BLD AUTO: 3.03 X10(6)UL
SODIUM SERPL-SCNC: 142 MMOL/L (ref 136–145)
WBC # BLD AUTO: 10.7 X10(3) UL (ref 4–11)

## 2022-08-21 PROCEDURE — 99233 SBSQ HOSP IP/OBS HIGH 50: CPT | Performed by: HOSPITALIST

## 2022-08-21 PROCEDURE — 3E0336Z INTRODUCTION OF NUTRITIONAL SUBSTANCE INTO PERIPHERAL VEIN, PERCUTANEOUS APPROACH: ICD-10-PCS | Performed by: INTERNAL MEDICINE

## 2022-08-21 RX ORDER — MAGNESIUM SULFATE HEPTAHYDRATE 40 MG/ML
2 INJECTION, SOLUTION INTRAVENOUS ONCE
Status: COMPLETED | OUTPATIENT
Start: 2022-08-21 | End: 2022-08-21

## 2022-08-22 ENCOUNTER — APPOINTMENT (OUTPATIENT)
Dept: GENERAL RADIOLOGY | Facility: HOSPITAL | Age: 77
End: 2022-08-22
Attending: PHYSICIAN ASSISTANT
Payer: MEDICARE

## 2022-08-22 ENCOUNTER — APPOINTMENT (OUTPATIENT)
Dept: GENERAL RADIOLOGY | Facility: HOSPITAL | Age: 77
DRG: 654 | End: 2022-08-22
Attending: PHYSICIAN ASSISTANT
Payer: MEDICARE

## 2022-08-22 LAB
ALBUMIN SERPL-MCNC: 2.1 G/DL (ref 3.4–5)
ALBUMIN/GLOB SERPL: 0.5 {RATIO} (ref 1–2)
ALP LIVER SERPL-CCNC: 51 U/L
ALT SERPL-CCNC: 18 U/L
ANION GAP SERPL CALC-SCNC: 5 MMOL/L (ref 0–18)
AST SERPL-CCNC: 50 U/L (ref 15–37)
BASOPHILS # BLD AUTO: 0.02 X10(3) UL (ref 0–0.2)
BASOPHILS NFR BLD AUTO: 0.2 %
BILIRUB SERPL-MCNC: 0.5 MG/DL (ref 0.1–2)
BUN BLD-MCNC: 12 MG/DL (ref 7–18)
CALCIUM BLD-MCNC: 8 MG/DL (ref 8.5–10.1)
CHLORIDE SERPL-SCNC: 106 MMOL/L (ref 98–112)
CO2 SERPL-SCNC: 27 MMOL/L (ref 21–32)
CREAT BLD-MCNC: 1.14 MG/DL
EOSINOPHIL # BLD AUTO: 0.11 X10(3) UL (ref 0–0.7)
EOSINOPHIL NFR BLD AUTO: 1.3 %
ERYTHROCYTE [DISTWIDTH] IN BLOOD BY AUTOMATED COUNT: 13.1 %
GFR SERPLBLD BASED ON 1.73 SQ M-ARVRAT: 67 ML/MIN/1.73M2 (ref 60–?)
GLOBULIN PLAS-MCNC: 4 G/DL (ref 2.8–4.4)
GLUCOSE BLD-MCNC: 111 MG/DL (ref 70–99)
GLUCOSE BLD-MCNC: 115 MG/DL (ref 70–99)
HCT VFR BLD AUTO: 26.2 %
HGB BLD-MCNC: 8.9 G/DL
IMM GRANULOCYTES # BLD AUTO: 0.03 X10(3) UL (ref 0–1)
IMM GRANULOCYTES NFR BLD: 0.3 %
LYMPHOCYTES # BLD AUTO: 0.87 X10(3) UL (ref 1–4)
LYMPHOCYTES NFR BLD AUTO: 10.1 %
MAGNESIUM SERPL-MCNC: 2 MG/DL (ref 1.6–2.6)
MCH RBC QN AUTO: 30 PG (ref 26–34)
MCHC RBC AUTO-ENTMCNC: 34 G/DL (ref 31–37)
MCV RBC AUTO: 88.2 FL
MONOCYTES # BLD AUTO: 0.92 X10(3) UL (ref 0.1–1)
MONOCYTES NFR BLD AUTO: 10.7 %
NEUTROPHILS # BLD AUTO: 6.64 X10 (3) UL (ref 1.5–7.7)
NEUTROPHILS # BLD AUTO: 6.64 X10(3) UL (ref 1.5–7.7)
NEUTROPHILS NFR BLD AUTO: 77.4 %
OSMOLALITY SERPL CALC.SUM OF ELEC: 286 MOSM/KG (ref 275–295)
PHOSPHATE SERPL-MCNC: 2.5 MG/DL (ref 2.5–4.9)
PLATELET # BLD AUTO: 236 10(3)UL (ref 150–450)
POTASSIUM SERPL-SCNC: 3.3 MMOL/L (ref 3.5–5.1)
POTASSIUM SERPL-SCNC: 3.3 MMOL/L (ref 3.5–5.1)
PROT SERPL-MCNC: 6.1 G/DL (ref 6.4–8.2)
RBC # BLD AUTO: 2.97 X10(6)UL
SODIUM SERPL-SCNC: 138 MMOL/L (ref 136–145)
TRIGL SERPL-MCNC: 177 MG/DL (ref 30–149)
WBC # BLD AUTO: 8.6 X10(3) UL (ref 4–11)

## 2022-08-22 PROCEDURE — 74019 RADEX ABDOMEN 2 VIEWS: CPT | Performed by: PHYSICIAN ASSISTANT

## 2022-08-22 RX ORDER — POTASSIUM CHLORIDE 14.9 MG/ML
20 INJECTION INTRAVENOUS ONCE
Status: COMPLETED | OUTPATIENT
Start: 2022-08-23 | End: 2022-08-23

## 2022-08-22 NOTE — WOUND PROGRESS NOTE
[facility]  Inpatient Wound Care Contact Note    Zainab Andre Patient Status:  Inpatient    1945 MRN [id number]   [facility] 3NE-A Attending Zachariah Quintero MD   Hosp Day # 13 PCP MORGAN LUA     Attempted to see patient for follow up ostomy care education/session. Patient is currently unavailable secondary to being at x-ray. Spoke with patient's nurse and asked her to contact when patient returns. We will continue to follow this patient while in-house and assist with wound care discharge planning. Please page #[phone] if any questions.       Thank you,    Roberto Maharaj, JENNIFER  [address] [facility]  [address] [facility]  [place], [address]  [phone]

## 2022-08-23 LAB
ALBUMIN SERPL-MCNC: 1.6 G/DL (ref 3.4–5)
ALBUMIN/GLOB SERPL: 0.5 {RATIO} (ref 1–2)
ALP LIVER SERPL-CCNC: 43 U/L
ALT SERPL-CCNC: 16 U/L
ANION GAP SERPL CALC-SCNC: 6 MMOL/L (ref 0–18)
AST SERPL-CCNC: 34 U/L (ref 15–37)
BASOPHILS # BLD AUTO: 0.02 X10(3) UL (ref 0–0.2)
BASOPHILS NFR BLD AUTO: 0.2 %
BILIRUB SERPL-MCNC: 0.3 MG/DL (ref 0.1–2)
BUN BLD-MCNC: 13 MG/DL (ref 7–18)
CALCIUM BLD-MCNC: 6.7 MG/DL (ref 8.5–10.1)
CHLORIDE SERPL-SCNC: 105 MMOL/L (ref 98–112)
CO2 SERPL-SCNC: 25 MMOL/L (ref 21–32)
CREAT BLD-MCNC: 0.98 MG/DL
EOSINOPHIL # BLD AUTO: 0.14 X10(3) UL (ref 0–0.7)
EOSINOPHIL NFR BLD AUTO: 1.3 %
ERYTHROCYTE [DISTWIDTH] IN BLOOD BY AUTOMATED COUNT: 13.2 %
ERYTHROCYTE [DISTWIDTH] IN BLOOD BY AUTOMATED COUNT: 13.2 %
GFR SERPLBLD BASED ON 1.73 SQ M-ARVRAT: 80 ML/MIN/1.73M2 (ref 60–?)
GLOBULIN PLAS-MCNC: 3.3 G/DL (ref 2.8–4.4)
GLUCOSE BLD-MCNC: 117 MG/DL (ref 70–99)
HCT VFR BLD AUTO: 27.2 %
HCT VFR BLD AUTO: 27.2 %
HGB BLD-MCNC: 9.3 G/DL
HGB BLD-MCNC: 9.3 G/DL
IMM GRANULOCYTES # BLD AUTO: 0.05 X10(3) UL (ref 0–1)
IMM GRANULOCYTES NFR BLD: 0.5 %
LYMPHOCYTES # BLD AUTO: 1.03 X10(3) UL (ref 1–4)
LYMPHOCYTES NFR BLD AUTO: 9.3 %
MAGNESIUM SERPL-MCNC: 1.7 MG/DL (ref 1.6–2.6)
MAGNESIUM SERPL-MCNC: 1.8 MG/DL (ref 1.6–2.6)
MCH RBC QN AUTO: 30 PG (ref 26–34)
MCH RBC QN AUTO: 30 PG (ref 26–34)
MCHC RBC AUTO-ENTMCNC: 34.2 G/DL (ref 31–37)
MCHC RBC AUTO-ENTMCNC: 34.2 G/DL (ref 31–37)
MCV RBC AUTO: 87.7 FL
MCV RBC AUTO: 87.7 FL
MONOCYTES # BLD AUTO: 0.99 X10(3) UL (ref 0.1–1)
MONOCYTES NFR BLD AUTO: 9 %
NEUTROPHILS # BLD AUTO: 8.79 X10 (3) UL (ref 1.5–7.7)
NEUTROPHILS # BLD AUTO: 8.79 X10(3) UL (ref 1.5–7.7)
NEUTROPHILS NFR BLD AUTO: 79.7 %
OSMOLALITY SERPL CALC.SUM OF ELEC: 283 MOSM/KG (ref 275–295)
PHOSPHATE SERPL-MCNC: 3.8 MG/DL (ref 2.5–4.9)
PLATELET # BLD AUTO: 249 10(3)UL (ref 150–450)
PLATELET # BLD AUTO: 249 10(3)UL (ref 150–450)
POTASSIUM SERPL-SCNC: 3.2 MMOL/L (ref 3.5–5.1)
POTASSIUM SERPL-SCNC: 3.6 MMOL/L (ref 3.5–5.1)
PROT SERPL-MCNC: 4.9 G/DL (ref 6.4–8.2)
RBC # BLD AUTO: 3.1 X10(6)UL
RBC # BLD AUTO: 3.1 X10(6)UL
SODIUM SERPL-SCNC: 136 MMOL/L (ref 136–145)
WBC # BLD AUTO: 11 X10(3) UL (ref 4–11)
WBC # BLD AUTO: 11 X10(3) UL (ref 4–11)

## 2022-08-23 PROCEDURE — 99233 SBSQ HOSP IP/OBS HIGH 50: CPT | Performed by: HOSPITALIST

## 2022-08-23 RX ORDER — POTASSIUM CHLORIDE 20 MEQ/1
40 TABLET, EXTENDED RELEASE ORAL ONCE
Status: DISCONTINUED | OUTPATIENT
Start: 2022-08-23 | End: 2022-08-23

## 2022-08-23 RX ORDER — PANTOPRAZOLE SODIUM 40 MG/1
40 TABLET, DELAYED RELEASE ORAL
Status: DISCONTINUED | OUTPATIENT
Start: 2022-08-23 | End: 2022-08-27

## 2022-08-23 RX ORDER — MAGNESIUM OXIDE 400 MG/1
400 TABLET ORAL ONCE
Status: COMPLETED | OUTPATIENT
Start: 2022-08-23 | End: 2022-08-23

## 2022-08-23 RX ORDER — LOSARTAN POTASSIUM 50 MG/1
50 TABLET ORAL DAILY
Qty: 30 TABLET | Refills: 0 | Status: SHIPPED | OUTPATIENT
Start: 2022-08-24

## 2022-08-23 NOTE — PLAN OF CARE
Aox4. VSS. Pt c/o abdominal pain after breakfast and lunch. Dr Deena Aviles and Dr Schwarz Gear notified. Diet changed back to clear liquids. POC discussed with pt, verbalized understanding. active bowel sounds. No bm today. Given norco for abdominal pain and warm pack. Fall precaution in place. Call light within easy reach. Will continue to monitor.

## 2022-08-24 ENCOUNTER — APPOINTMENT (OUTPATIENT)
Dept: GENERAL RADIOLOGY | Facility: HOSPITAL | Age: 77
DRG: 654 | End: 2022-08-24
Attending: INTERNAL MEDICINE
Payer: MEDICARE

## 2022-08-24 ENCOUNTER — APPOINTMENT (OUTPATIENT)
Dept: GENERAL RADIOLOGY | Facility: HOSPITAL | Age: 77
End: 2022-08-24
Attending: INTERNAL MEDICINE
Payer: MEDICARE

## 2022-08-24 LAB
ALBUMIN SERPL-MCNC: 1.6 G/DL (ref 3.4–5)
ALBUMIN/GLOB SERPL: 0.5 {RATIO} (ref 1–2)
ALP LIVER SERPL-CCNC: 39 U/L
ALT SERPL-CCNC: 13 U/L
ANION GAP SERPL CALC-SCNC: 6 MMOL/L (ref 0–18)
AST SERPL-CCNC: 24 U/L (ref 15–37)
BILIRUB SERPL-MCNC: 0.3 MG/DL (ref 0.1–2)
BUN BLD-MCNC: 21 MG/DL (ref 7–18)
CALCIUM BLD-MCNC: 6.8 MG/DL (ref 8.5–10.1)
CHLORIDE SERPL-SCNC: 104 MMOL/L (ref 98–112)
CO2 SERPL-SCNC: 25 MMOL/L (ref 21–32)
CREAT BLD-MCNC: 1.12 MG/DL
CREAT FLD-MCNC: 1.06 MG/DL
GFR SERPLBLD BASED ON 1.73 SQ M-ARVRAT: 69 ML/MIN/1.73M2 (ref 60–?)
GLOBULIN PLAS-MCNC: 3.3 G/DL (ref 2.8–4.4)
GLUCOSE BLD-MCNC: 109 MG/DL (ref 70–99)
LIPASE SERPL-CCNC: 347 U/L (ref 73–393)
MAGNESIUM SERPL-MCNC: 1.8 MG/DL (ref 1.6–2.6)
OSMOLALITY SERPL CALC.SUM OF ELEC: 284 MOSM/KG (ref 275–295)
PHOSPHATE SERPL-MCNC: 3.4 MG/DL (ref 2.5–4.9)
POTASSIUM SERPL-SCNC: 4.1 MMOL/L (ref 3.5–5.1)
POTASSIUM SERPL-SCNC: 4.1 MMOL/L (ref 3.5–5.1)
PROT SERPL-MCNC: 4.9 G/DL (ref 6.4–8.2)
SODIUM SERPL-SCNC: 135 MMOL/L (ref 136–145)

## 2022-08-24 PROCEDURE — 74019 RADEX ABDOMEN 2 VIEWS: CPT | Performed by: INTERNAL MEDICINE

## 2022-08-24 PROCEDURE — 99233 SBSQ HOSP IP/OBS HIGH 50: CPT | Performed by: INTERNAL MEDICINE

## 2022-08-24 RX ORDER — MAGNESIUM OXIDE 400 MG/1
400 TABLET ORAL ONCE
Status: COMPLETED | OUTPATIENT
Start: 2022-08-24 | End: 2022-08-24

## 2022-08-24 RX ORDER — ACETAMINOPHEN 160 MG/5ML
1000 SOLUTION ORAL EVERY 6 HOURS PRN
Status: DISCONTINUED | OUTPATIENT
Start: 2022-08-24 | End: 2022-08-27

## 2022-08-24 RX ORDER — ENOXAPARIN SODIUM 100 MG/ML
40 INJECTION SUBCUTANEOUS DAILY
Status: DISCONTINUED | OUTPATIENT
Start: 2022-08-24 | End: 2022-08-27

## 2022-08-24 NOTE — PROGRESS NOTES
Assumed patient care at 15:00. Contact isolation in place. A/O x4. Room air. No tele ordered at this time. Electrolyte protocol (non cardiac). Bilateral neph tubes C/D/I. Urostomy C/D/I. CHI drain C/D/I. Clear liquid diet. Midline on the right upper C/D/I. Left upper extended running PPN. Midline abdominal site C/D/I.  All safety precautions are in place and will continue to monitor the patient

## 2022-08-24 NOTE — PLAN OF CARE
CHI to [facility]Q  CHI creat sent this am  Per urology drains to stay  Continues to have abd pain and discomfort  Abd xray done  Pain medication prn  Wife at bedside  Ppn infusing

## 2022-08-24 NOTE — PROGRESS NOTES
Assumed patient care at [address]  Patient oriented x 4, not on tele, room air  With elevated bp and given prn hydralazine  PPN ongoing per orders  On clear liquid diet   No pain, c/o constipation and given miralax   Safety and isolation precautions in place   Call light is in reach   Will continue to monitor pt

## 2022-08-25 LAB
ALBUMIN SERPL-MCNC: 1.9 G/DL (ref 3.4–5)
ALBUMIN/GLOB SERPL: 0.5 {RATIO} (ref 1–2)
ALP LIVER SERPL-CCNC: 48 U/L
ALT SERPL-CCNC: 15 U/L
ANION GAP SERPL CALC-SCNC: 4 MMOL/L (ref 0–18)
AST SERPL-CCNC: 24 U/L (ref 15–37)
BILIRUB SERPL-MCNC: 0.3 MG/DL (ref 0.1–2)
BUN BLD-MCNC: 21 MG/DL (ref 7–18)
CALCIUM BLD-MCNC: 7.6 MG/DL (ref 8.5–10.1)
CHLORIDE SERPL-SCNC: 109 MMOL/L (ref 98–112)
CO2 SERPL-SCNC: 22 MMOL/L (ref 21–32)
CREAT BLD-MCNC: 1.17 MG/DL
GFR SERPLBLD BASED ON 1.73 SQ M-ARVRAT: 65 ML/MIN/1.73M2 (ref 60–?)
GLOBULIN PLAS-MCNC: 3.9 G/DL (ref 2.8–4.4)
GLUCOSE BLD-MCNC: 108 MG/DL (ref 70–99)
MAGNESIUM SERPL-MCNC: 2 MG/DL (ref 1.6–2.6)
OSMOLALITY SERPL CALC.SUM OF ELEC: 284 MOSM/KG (ref 275–295)
PHOSPHATE SERPL-MCNC: 3.3 MG/DL (ref 2.5–4.9)
POTASSIUM SERPL-SCNC: 4.8 MMOL/L (ref 3.5–5.1)
PROT SERPL-MCNC: 5.8 G/DL (ref 6.4–8.2)
SODIUM SERPL-SCNC: 135 MMOL/L (ref 136–145)

## 2022-08-25 PROCEDURE — 99232 SBSQ HOSP IP/OBS MODERATE 35: CPT | Performed by: INTERNAL MEDICINE

## 2022-08-25 NOTE — PROGRESS NOTES
Pt alert and oriented. Contact isolation. No tele. RA. Urostomy draining. CHI drain with bloody output. Midline abdominal incision. Tylenol for pain PRN.  CLD.  TPN. Goal to advance diet.

## 2022-08-26 LAB
ALBUMIN SERPL-MCNC: 1.8 G/DL (ref 3.4–5)
ALBUMIN/GLOB SERPL: 0.5 {RATIO} (ref 1–2)
ALP LIVER SERPL-CCNC: 52 U/L
ALT SERPL-CCNC: 12 U/L
ANION GAP SERPL CALC-SCNC: 3 MMOL/L (ref 0–18)
AST SERPL-CCNC: 19 U/L (ref 15–37)
BILIRUB SERPL-MCNC: 0.3 MG/DL (ref 0.1–2)
BUN BLD-MCNC: 16 MG/DL (ref 7–18)
CALCIUM BLD-MCNC: 7.7 MG/DL (ref 8.5–10.1)
CHLORIDE SERPL-SCNC: 109 MMOL/L (ref 98–112)
CO2 SERPL-SCNC: 23 MMOL/L (ref 21–32)
CREAT BLD-MCNC: 1.06 MG/DL
GFR SERPLBLD BASED ON 1.73 SQ M-ARVRAT: 73 ML/MIN/1.73M2 (ref 60–?)
GLOBULIN PLAS-MCNC: 3.8 G/DL (ref 2.8–4.4)
GLUCOSE BLD-MCNC: 104 MG/DL (ref 70–99)
MAGNESIUM SERPL-MCNC: 1.9 MG/DL (ref 1.6–2.6)
OSMOLALITY SERPL CALC.SUM OF ELEC: 281 MOSM/KG (ref 275–295)
PHOSPHATE SERPL-MCNC: 2.8 MG/DL (ref 2.5–4.9)
POTASSIUM SERPL-SCNC: 4.6 MMOL/L (ref 3.5–5.1)
PROT SERPL-MCNC: 5.6 G/DL (ref 6.4–8.2)
SODIUM SERPL-SCNC: 135 MMOL/L (ref 136–145)

## 2022-08-26 PROCEDURE — 99232 SBSQ HOSP IP/OBS MODERATE 35: CPT | Performed by: INTERNAL MEDICINE

## 2022-08-26 RX ORDER — DOCUSATE SODIUM 100 MG/1
100 CAPSULE, LIQUID FILLED ORAL 2 TIMES DAILY
Status: DISCONTINUED | OUTPATIENT
Start: 2022-08-26 | End: 2022-08-27

## 2022-08-27 VITALS
TEMPERATURE: 99 F | WEIGHT: 171.13 LBS | HEIGHT: 68 IN | SYSTOLIC BLOOD PRESSURE: 138 MMHG | OXYGEN SATURATION: 98 % | RESPIRATION RATE: 17 BRPM | DIASTOLIC BLOOD PRESSURE: 58 MMHG | HEART RATE: 59 BPM | BODY MASS INDEX: 25.93 KG/M2

## 2022-08-27 PROBLEM — N39.0 URINARY TRACT INFECTION WITH HEMATURIA, SITE UNSPECIFIED: Status: RESOLVED | Noted: 2022-08-07 | Resolved: 2022-08-27

## 2022-08-27 PROBLEM — R31.9 URINARY TRACT INFECTION WITH HEMATURIA, SITE UNSPECIFIED: Status: RESOLVED | Noted: 2022-08-07 | Resolved: 2022-08-27

## 2022-08-27 PROBLEM — N30.40 RADIATION CYSTITIS: Status: RESOLVED | Noted: 2022-08-07 | Resolved: 2022-08-27

## 2022-08-27 PROBLEM — R33.9 URINARY RETENTION: Status: RESOLVED | Noted: 2022-08-07 | Resolved: 2022-08-27

## 2022-08-27 PROBLEM — R31.0 GROSS HEMATURIA: Status: RESOLVED | Noted: 2022-08-07 | Resolved: 2022-08-27

## 2022-08-27 LAB
CREAT BLD-MCNC: 1.12 MG/DL
ERYTHROCYTE [DISTWIDTH] IN BLOOD BY AUTOMATED COUNT: 13.2 %
GFR SERPLBLD BASED ON 1.73 SQ M-ARVRAT: 69 ML/MIN/1.73M2 (ref 60–?)
HCT VFR BLD AUTO: 25 %
HGB BLD-MCNC: 8.1 G/DL
MCH RBC QN AUTO: 29.1 PG (ref 26–34)
MCHC RBC AUTO-ENTMCNC: 32.4 G/DL (ref 31–37)
MCV RBC AUTO: 89.9 FL
PLATELET # BLD AUTO: 227 10(3)UL (ref 150–450)
RBC # BLD AUTO: 2.78 X10(6)UL
WBC # BLD AUTO: 9.1 X10(3) UL (ref 4–11)

## 2022-08-27 PROCEDURE — 99239 HOSP IP/OBS DSCHRG MGMT >30: CPT | Performed by: INTERNAL MEDICINE

## 2022-08-27 RX ORDER — FUROSEMIDE 20 MG/1
20 TABLET ORAL ONCE
Status: COMPLETED | OUTPATIENT
Start: 2022-08-27 | End: 2022-08-27

## 2022-08-27 RX ORDER — AMOXICILLIN 500 MG/1
500 CAPSULE ORAL 3 TIMES DAILY
Qty: 15 CAPSULE | Refills: 0 | Status: SHIPPED | OUTPATIENT
Start: 2022-08-27 | End: 2022-09-01

## 2022-08-27 RX ORDER — PANTOPRAZOLE SODIUM 40 MG/1
40 TABLET, DELAYED RELEASE ORAL
Qty: 60 TABLET | Refills: 0 | Status: SHIPPED | OUTPATIENT
Start: 2022-08-27

## 2022-08-27 RX ORDER — CIPROFLOXACIN 500 MG/1
500 TABLET, FILM COATED ORAL 2 TIMES DAILY
Qty: 10 TABLET | Refills: 0 | Status: SHIPPED | OUTPATIENT
Start: 2022-08-27 | End: 2022-09-01

## 2022-08-27 NOTE — DIETARY NOTE
Clinical Dietitian    PPN discontinued by MD. Pt tolerating diet. + BM. RD will follow per nutrition protocol.  See full assessment 8/25    Jeff Singh RD, LDN, [address]   Clinical Dietitian  Phone [phone]

## 2022-08-27 NOTE — CM/SW NOTE
Notified by RN pt is cleared for discharge. MADISON messaged Ana RESENDIZ in [place] and spoke to Juaquin at [place]. MADISON notified Juaquin pt will be discharging today. Juaquin stated she will notify her  of pt's discharge and asked for pt to be sent home with two changes worth of ostomy supplies. Updated RN.     MELE Singh  Discharge Planner

## 2022-08-27 NOTE — PROGRESS NOTES
NURSING DISCHARGE NOTE    Discharged Home via Wheelchair. Accompanied by Spouse and Support staff  Belongings Taken by patient/family. AVS reviewed  PIV and Midline Discontinued. AVS reviewed 2x with wife. Gave prescription losartan to pts wife with discharge papers. Educated the prescription needed to be taken and given to the pharmacy. All questions answered.

## 2022-08-28 PROBLEM — R31.9 URINARY TRACT INFECTION WITH HEMATURIA: Status: ACTIVE | Noted: 2022-08-07

## 2022-08-28 PROBLEM — N39.0 URINARY TRACT INFECTION WITH HEMATURIA: Status: ACTIVE | Noted: 2022-08-07

## 2022-08-29 LAB — H PYLORI AG STL QL IA: NEGATIVE

## 2022-08-31 PROBLEM — H26.9 CATARACT: Status: ACTIVE | Noted: 2022-08-31

## 2022-08-31 PROBLEM — F41.9 ANXIETY: Status: ACTIVE | Noted: 2022-08-03

## 2022-08-31 PROBLEM — D64.9 ANEMIA: Status: ACTIVE | Noted: 2021-07-28

## 2022-08-31 PROBLEM — N52.9 ERECTILE DYSFUNCTION: Status: ACTIVE | Noted: 2022-08-31

## 2022-08-31 PROBLEM — N32.89 BLADDER SPASM: Status: ACTIVE | Noted: 2022-05-05

## 2022-08-31 PROBLEM — I70.1: Status: ACTIVE | Noted: 2021-07-28

## 2022-08-31 PROBLEM — C61 PROSTATE CANCER (HCC): Status: ACTIVE | Noted: 2022-08-31

## 2022-08-31 PROBLEM — R31.9 HEMATURIA: Status: ACTIVE | Noted: 2022-04-29

## 2022-08-31 PROBLEM — I25.10 ATHEROSCLEROTIC HEART DISEASE OF NATIVE CORONARY ARTERY WITHOUT ANGINA PECTORIS: Status: ACTIVE | Noted: 2020-07-31

## 2022-08-31 PROBLEM — I10 HTN (HYPERTENSION): Status: ACTIVE | Noted: 2022-08-31

## 2022-09-01 ENCOUNTER — TELEPHONE (OUTPATIENT)
Dept: WOUND CARE | Facility: HOSPITAL | Age: 77
End: 2022-09-01

## 2022-09-01 NOTE — TELEPHONE ENCOUNTER
[facility]  Outpatient Wound Care Contact Note  Attempted to check in with patient to see if they have any questions or concerns regarding ostomy care. Left voicemail with office number for the patient to call back if any concerns or questions.

## 2022-12-14 ENCOUNTER — APPOINTMENT (OUTPATIENT)
Dept: WOUND CARE | Facility: HOSPITAL | Age: 77
End: 2022-12-14
Attending: UROLOGY
Payer: MEDICARE

## 2022-12-16 ENCOUNTER — OFFICE VISIT (OUTPATIENT)
Dept: WOUND CARE | Facility: HOSPITAL | Age: 77
End: 2022-12-16
Attending: UROLOGY
Payer: MEDICARE

## 2022-12-16 DIAGNOSIS — Z71.89 OSTOMY NURSE CONSULTATION: Primary | ICD-10-CM

## 2022-12-16 PROCEDURE — 99212 OFFICE O/P EST SF 10 MIN: CPT

## 2023-05-19 PROBLEM — D50.9 IRON DEFICIENCY ANEMIA: Status: ACTIVE | Noted: 2023-05-19

## 2023-05-19 PROBLEM — H90.3 SENSORINEURAL HEARING LOSS, BILATERAL: Status: ACTIVE | Noted: 2023-05-19

## 2023-05-19 PROBLEM — R00.1 BRADYCARDIA: Status: ACTIVE | Noted: 2023-05-19

## 2023-05-19 PROBLEM — J30.9 ALLERGIC RHINITIS: Status: ACTIVE | Noted: 2023-05-19

## 2023-05-19 PROBLEM — F32.9 MAJOR DEPRESSIVE DISORDER, SINGLE EPISODE, UNSPECIFIED: Status: ACTIVE | Noted: 2023-05-19

## 2024-05-22 ENCOUNTER — LAB ENCOUNTER (OUTPATIENT)
Dept: LAB | Age: 79
End: 2024-05-22
Attending: INTERNAL MEDICINE

## 2024-05-22 DIAGNOSIS — E78.2 MIXED HYPERLIPIDEMIA: ICD-10-CM

## 2024-05-22 DIAGNOSIS — I25.811: ICD-10-CM

## 2024-05-22 DIAGNOSIS — I10 HTN (HYPERTENSION): Primary | ICD-10-CM

## 2024-05-22 LAB
ALBUMIN SERPL-MCNC: 3.8 G/DL (ref 3.4–5)
ALBUMIN/GLOB SERPL: 1 {RATIO} (ref 1–2)
ALP LIVER SERPL-CCNC: 121 U/L
ALT SERPL-CCNC: 28 U/L
ANION GAP SERPL CALC-SCNC: 5 MMOL/L (ref 0–18)
AST SERPL-CCNC: 33 U/L (ref 15–37)
BILIRUB SERPL-MCNC: 0.5 MG/DL (ref 0.1–2)
BUN BLD-MCNC: 41 MG/DL (ref 9–23)
CALCIUM BLD-MCNC: 8.9 MG/DL (ref 8.5–10.1)
CHLORIDE SERPL-SCNC: 114 MMOL/L (ref 98–112)
CHOLEST SERPL-MCNC: 183 MG/DL (ref ?–200)
CO2 SERPL-SCNC: 20 MMOL/L (ref 21–32)
CREAT BLD-MCNC: 1.74 MG/DL
EGFRCR SERPLBLD CKD-EPI 2021: 40 ML/MIN/1.73M2 (ref 60–?)
FASTING PATIENT LIPID ANSWER: YES
FASTING STATUS PATIENT QL REPORTED: YES
GLOBULIN PLAS-MCNC: 4 G/DL (ref 2.8–4.4)
GLUCOSE BLD-MCNC: 105 MG/DL (ref 70–99)
HDLC SERPL-MCNC: 47 MG/DL (ref 40–59)
LDLC SERPL CALC-MCNC: 113 MG/DL (ref ?–100)
NONHDLC SERPL-MCNC: 136 MG/DL (ref ?–130)
OSMOLALITY SERPL CALC.SUM OF ELEC: 298 MOSM/KG (ref 275–295)
POTASSIUM SERPL-SCNC: 5.4 MMOL/L (ref 3.5–5.1)
PROT SERPL-MCNC: 7.8 G/DL (ref 6.4–8.2)
SODIUM SERPL-SCNC: 139 MMOL/L (ref 136–145)
TRIGL SERPL-MCNC: 129 MG/DL (ref 30–149)
VLDLC SERPL CALC-MCNC: 22 MG/DL (ref 0–30)

## 2024-05-22 PROCEDURE — 80053 COMPREHEN METABOLIC PANEL: CPT

## 2024-05-22 PROCEDURE — 36415 COLL VENOUS BLD VENIPUNCTURE: CPT

## 2024-05-22 PROCEDURE — 80061 LIPID PANEL: CPT

## 2024-12-18 ENCOUNTER — HOSPITAL ENCOUNTER (OUTPATIENT)
Dept: LAB | Facility: HOSPITAL | Age: 79
Discharge: HOME | End: 2024-12-18
Attending: INTERNAL MEDICINE
Payer: MEDICARE

## 2024-12-18 LAB
ALBUMIN SERPL-MCNC: 4.2 G/DL (ref 3.2–4.8)
ALBUMIN/GLOB SERPL: 1.1 {RATIO} (ref 1–2)
ALP LIVER SERPL-CCNC: 94 U/L
ALT SERPL-CCNC: 33 U/L
ANION GAP SERPL CALC-SCNC: 8 MMOL/L (ref 0–18)
AST SERPL-CCNC: 40 U/L (ref ?–34)
BILIRUB SERPL-MCNC: 0.4 MG/DL (ref 0.2–1.1)
BUN BLD-MCNC: 45 MG/DL (ref 9–23)
CALCIUM BLD-MCNC: 8.7 MG/DL (ref 8.7–10.4)
CHLORIDE SERPL-SCNC: 110 MMOL/L (ref 98–112)
CHOLEST SERPL-MCNC: 228 MG/DL (ref ?–200)
CO2 SERPL-SCNC: 23 MMOL/L (ref 21–32)
CREAT BLD-MCNC: 1.86 MG/DL
EGFRCR SERPLBLD CKD-EPI 2021: 37 ML/MIN/1.73M2 (ref 60–?)
FASTING PATIENT LIPID ANSWER: YES
FASTING STATUS PATIENT QL REPORTED: YES
GLOBULIN PLAS-MCNC: 3.7 G/DL (ref 2–3.5)
GLUCOSE BLD-MCNC: 97 MG/DL (ref 70–99)
HDLC SERPL-MCNC: 46 MG/DL (ref 40–59)
LDLC SERPL CALC-MCNC: 154 MG/DL (ref ?–100)
NONHDLC SERPL-MCNC: 182 MG/DL (ref ?–130)
OSMOLALITY SERPL CALC.SUM OF ELEC: 303 MOSM/KG (ref 275–295)
POTASSIUM SERPL-SCNC: 4.8 MMOL/L (ref 3.5–5.1)
PROT SERPL-MCNC: 7.9 G/DL (ref 5.7–8.2)
SODIUM SERPL-SCNC: 141 MMOL/L (ref 136–145)
TRIGL SERPL-MCNC: 153 MG/DL (ref 30–149)
VLDLC SERPL CALC-MCNC: 29 MG/DL (ref 0–30)

## 2024-12-18 PROCEDURE — 80053 COMPREHEN METABOLIC PANEL: CPT | Performed by: INTERNAL MEDICINE

## 2024-12-18 PROCEDURE — 36415 COLL VENOUS BLD VENIPUNCTURE: CPT | Performed by: INTERNAL MEDICINE

## 2024-12-18 PROCEDURE — 80061 LIPID PANEL: CPT | Performed by: INTERNAL MEDICINE

## 2025-05-05 ENCOUNTER — HOSPITAL ENCOUNTER (OUTPATIENT)
Dept: LAB | Facility: HOSPITAL | Age: 80
Discharge: HOME | End: 2025-05-05
Attending: INTERNAL MEDICINE
Payer: MEDICARE

## 2025-05-05 LAB
ALT SERPL-CCNC: 23 U/L (ref 10–49)
AST SERPL-CCNC: 38 U/L (ref ?–34)
CHOLEST SERPL-MCNC: 193 MG/DL (ref ?–200)
FASTING PATIENT LIPID ANSWER: YES
HDLC SERPL-MCNC: 46 MG/DL (ref 40–59)
LDLC SERPL CALC-MCNC: 126 MG/DL (ref ?–100)
NONHDLC SERPL-MCNC: 147 MG/DL (ref ?–130)
TRIGL SERPL-MCNC: 115 MG/DL (ref 30–149)
VLDLC SERPL CALC-MCNC: 21 MG/DL (ref 0–30)

## 2025-05-05 PROCEDURE — 80061 LIPID PANEL: CPT | Performed by: INTERNAL MEDICINE

## 2025-05-05 PROCEDURE — 84460 ALANINE AMINO (ALT) (SGPT): CPT | Performed by: INTERNAL MEDICINE

## 2025-05-05 PROCEDURE — 36415 COLL VENOUS BLD VENIPUNCTURE: CPT | Performed by: INTERNAL MEDICINE

## 2025-05-05 PROCEDURE — 84450 TRANSFERASE (AST) (SGOT): CPT | Performed by: INTERNAL MEDICINE

## 2025-06-16 ENCOUNTER — TELEPHONE (OUTPATIENT)
Dept: WOUND CARE | Facility: HOSPITAL | Age: 80
End: 2025-06-16

## 2025-06-16 NOTE — TELEPHONE ENCOUNTER
Patient left a voicemail requesting a callback from a nurse. This writer called patient who stated that he has an ostomy appliance and the peristomal skin is a little pink and excoriated. Instructed patient on how to perform crusting method with stoma powder and skin prep. Also instructed patient that if the peristomal skin does not improve or worsens after using the crusting method, he should call the clinic to setup an appointment. Patient verbalized understanding.

## 2025-08-13 ENCOUNTER — LAB ENCOUNTER (OUTPATIENT)
Dept: LAB | Facility: HOSPITAL | Age: 80
End: 2025-08-13
Attending: INTERNAL MEDICINE

## 2025-08-13 DIAGNOSIS — D64.9 ANEMIA, UNSPECIFIED: Primary | ICD-10-CM

## 2025-08-13 DIAGNOSIS — I25.811: ICD-10-CM

## 2025-08-13 DIAGNOSIS — I20.89 ANGINA DECUBITUS: ICD-10-CM

## 2025-08-13 DIAGNOSIS — I10 ESSENTIAL HYPERTENSION, MALIGNANT: ICD-10-CM

## 2025-08-13 LAB
ANION GAP SERPL CALC-SCNC: 11 MMOL/L (ref 0–18)
BASOPHILS # BLD AUTO: 0.02 X10(3) UL (ref 0–0.2)
BASOPHILS NFR BLD AUTO: 0.3 %
BUN BLD-MCNC: 67 MG/DL (ref 9–23)
CALCIUM BLD-MCNC: 8.6 MG/DL (ref 8.7–10.6)
CHLORIDE SERPL-SCNC: 106 MMOL/L (ref 98–112)
CO2 SERPL-SCNC: 21 MMOL/L (ref 21–32)
CREAT BLD-MCNC: 2.8 MG/DL (ref 0.7–1.3)
EGFRCR SERPLBLD CKD-EPI 2021: 22 ML/MIN/1.73M2 (ref 60–?)
EOSINOPHIL # BLD AUTO: 0.27 X10(3) UL (ref 0–0.7)
EOSINOPHIL NFR BLD AUTO: 4 %
ERYTHROCYTE [DISTWIDTH] IN BLOOD BY AUTOMATED COUNT: 13 %
FASTING STATUS PATIENT QL REPORTED: NO
GLUCOSE BLD-MCNC: 92 MG/DL (ref 70–99)
HCT VFR BLD AUTO: 25.4 % (ref 39–53)
HGB BLD-MCNC: 8.3 G/DL (ref 13–17.5)
IMM GRANULOCYTES # BLD AUTO: 0.01 X10(3) UL (ref 0–1)
IMM GRANULOCYTES NFR BLD: 0.1 %
LYMPHOCYTES # BLD AUTO: 1.88 X10(3) UL (ref 1–4)
LYMPHOCYTES NFR BLD AUTO: 27.7 %
MCH RBC QN AUTO: 31.9 PG (ref 26–34)
MCHC RBC AUTO-ENTMCNC: 32.7 G/DL (ref 31–37)
MCV RBC AUTO: 97.7 FL (ref 80–100)
MONOCYTES # BLD AUTO: 0.76 X10(3) UL (ref 0.1–1)
MONOCYTES NFR BLD AUTO: 11.2 %
NEUTROPHILS # BLD AUTO: 3.85 X10 (3) UL (ref 1.5–7.7)
NEUTROPHILS # BLD AUTO: 3.85 X10(3) UL (ref 1.5–7.7)
NEUTROPHILS NFR BLD AUTO: 56.7 %
OSMOLALITY SERPL CALC.SUM OF ELEC: 305 MOSM/KG (ref 275–295)
PLATELET # BLD AUTO: 200 10(3)UL (ref 150–450)
POTASSIUM SERPL-SCNC: 5.7 MMOL/L (ref 3.5–5.1)
RBC # BLD AUTO: 2.6 X10(6)UL (ref 3.8–5.8)
SODIUM SERPL-SCNC: 138 MMOL/L (ref 136–145)
WBC # BLD AUTO: 6.8 X10(3) UL (ref 4–11)

## 2025-08-13 PROCEDURE — 85025 COMPLETE CBC W/AUTO DIFF WBC: CPT

## 2025-08-13 PROCEDURE — 36415 COLL VENOUS BLD VENIPUNCTURE: CPT

## 2025-08-13 PROCEDURE — 80048 BASIC METABOLIC PNL TOTAL CA: CPT

## 2025-08-14 RX ORDER — METOPROLOL SUCCINATE 25 MG/1
25 TABLET, EXTENDED RELEASE ORAL DAILY
COMMUNITY

## 2025-08-14 RX ORDER — BEMPEDOIC ACID AND EZETIMIBE 180; 10 MG/1; MG/1
1 TABLET, FILM COATED ORAL DAILY
COMMUNITY

## 2025-08-14 RX ORDER — CHLORTHALIDONE 25 MG/1
25 TABLET ORAL DAILY
COMMUNITY

## 2025-08-14 RX ORDER — OLMESARTAN MEDOXOMIL 40 MG/1
40 TABLET ORAL DAILY
COMMUNITY

## 2025-08-18 ENCOUNTER — HOSPITAL ENCOUNTER (OUTPATIENT)
Dept: INTERVENTIONAL RADIOLOGY/VASCULAR | Facility: HOSPITAL | Age: 80
Discharge: HOME | End: 2025-08-18
Attending: INTERNAL MEDICINE | Admitting: INTERNAL MEDICINE

## 2025-08-18 VITALS
RESPIRATION RATE: 13 BRPM | HEIGHT: 68 IN | SYSTOLIC BLOOD PRESSURE: 175 MMHG | OXYGEN SATURATION: 98 % | TEMPERATURE: 98 F | BODY MASS INDEX: 27.58 KG/M2 | DIASTOLIC BLOOD PRESSURE: 62 MMHG | WEIGHT: 182 LBS | HEART RATE: 49 BPM

## 2025-08-18 DIAGNOSIS — R94.39 ABNORMAL STRESS TEST: ICD-10-CM

## 2025-08-18 DIAGNOSIS — R07.9 CHEST PAIN: ICD-10-CM

## 2025-08-18 LAB
ANION GAP SERPL CALC-SCNC: 11 MMOL/L (ref 0–18)
ATRIAL RATE: 52 BPM
BUN BLD-MCNC: 66 MG/DL (ref 9–23)
CALCIUM BLD-MCNC: 9.1 MG/DL (ref 8.7–10.6)
CHLORIDE SERPL-SCNC: 110 MMOL/L (ref 98–112)
CO2 SERPL-SCNC: 19 MMOL/L (ref 21–32)
CREAT BLD-MCNC: 2.51 MG/DL (ref 0.7–1.3)
EGFRCR SERPLBLD CKD-EPI 2021: 26 ML/MIN/1.73M2 (ref 60–?)
FASTING STATUS PATIENT QL REPORTED: YES
GLUCOSE BLD-MCNC: 99 MG/DL (ref 70–99)
OSMOLALITY SERPL CALC.SUM OF ELEC: 309 MOSM/KG (ref 275–295)
P AXIS: 33 DEGREES
P-R INTERVAL: 176 MS
POTASSIUM SERPL-SCNC: 5.5 MMOL/L (ref 3.5–5.1)
Q-T INTERVAL: 476 MS
QRS DURATION: 88 MS
QTC CALCULATION (BEZET): 442 MS
R AXIS: 19 DEGREES
SODIUM SERPL-SCNC: 140 MMOL/L (ref 136–145)
T AXIS: 60 DEGREES
VENTRICULAR RATE: 52 BPM

## 2025-08-18 PROCEDURE — 93010 ELECTROCARDIOGRAM REPORT: CPT | Performed by: INTERNAL MEDICINE

## 2025-08-18 PROCEDURE — 80048 BASIC METABOLIC PNL TOTAL CA: CPT | Performed by: INTERNAL MEDICINE

## 2025-08-18 PROCEDURE — 93005 ELECTROCARDIOGRAM TRACING: CPT

## 2025-08-18 RX ORDER — SODIUM CHLORIDE 9 MG/ML
INJECTION, SOLUTION INTRAVENOUS
Status: DISCONTINUED | OUTPATIENT
Start: 2025-08-19 | End: 2025-08-18

## 2025-08-18 RX ORDER — SODIUM POLYSTYRENE SULFONATE 4.1 MEQ/G
15 POWDER, FOR SUSPENSION ORAL; RECTAL ONCE
Status: DISCONTINUED | OUTPATIENT
Start: 2025-08-18 | End: 2025-08-18

## 2025-08-18 RX ORDER — SODIUM POLYSTYRENE SULFONATE 4.1 MEQ/G
15 POWDER, FOR SUSPENSION ORAL; RECTAL ONCE
Qty: 60 ML | Refills: 0 | Status: SHIPPED | OUTPATIENT
Start: 2025-08-18 | End: 2025-08-18

## 2025-08-26 ENCOUNTER — LAB ENCOUNTER (OUTPATIENT)
Dept: LAB | Age: 80
End: 2025-08-26
Attending: INTERNAL MEDICINE

## 2025-08-26 ENCOUNTER — RESULTS FOLLOW-UP (OUTPATIENT)
Dept: NEPHROLOGY | Facility: CLINIC | Age: 80
End: 2025-08-26

## 2025-08-26 ENCOUNTER — OFFICE VISIT (OUTPATIENT)
Dept: NEPHROLOGY | Facility: CLINIC | Age: 80
End: 2025-08-26

## 2025-08-26 VITALS — BODY MASS INDEX: 28 KG/M2 | SYSTOLIC BLOOD PRESSURE: 168 MMHG | WEIGHT: 182.19 LBS | DIASTOLIC BLOOD PRESSURE: 64 MMHG

## 2025-08-26 DIAGNOSIS — N17.9 AKI (ACUTE KIDNEY INJURY): ICD-10-CM

## 2025-08-26 DIAGNOSIS — N18.32 STAGE 3B CHRONIC KIDNEY DISEASE (HCC): ICD-10-CM

## 2025-08-26 DIAGNOSIS — I10 PRIMARY HYPERTENSION: ICD-10-CM

## 2025-08-26 DIAGNOSIS — N17.9 AKI (ACUTE KIDNEY INJURY): Primary | ICD-10-CM

## 2025-08-26 LAB
ALBUMIN SERPL-MCNC: 4.6 G/DL (ref 3.2–4.8)
ALBUMIN/GLOB SERPL: 1.5 (ref 1–2)
ALP LIVER SERPL-CCNC: 64 U/L (ref 45–117)
ALT SERPL-CCNC: 30 U/L (ref 10–49)
ANION GAP SERPL CALC-SCNC: 11 MMOL/L (ref 0–18)
AST SERPL-CCNC: 40 U/L (ref ?–34)
BILIRUB SERPL-MCNC: 0.3 MG/DL (ref 0.2–1.1)
BILIRUB UR QL STRIP.AUTO: NEGATIVE
BUN BLD-MCNC: 66 MG/DL (ref 9–23)
CALCIUM BLD-MCNC: 9.3 MG/DL (ref 8.7–10.6)
CHLORIDE SERPL-SCNC: 109 MMOL/L (ref 98–112)
CLARITY UR REFRACT.AUTO: CLEAR
CO2 SERPL-SCNC: 21 MMOL/L (ref 21–32)
CREAT BLD-MCNC: 2.71 MG/DL (ref 0.7–1.3)
CREAT UR-SCNC: 56.4 MG/DL
CREAT UR-SCNC: 56.4 MG/DL
EGFRCR SERPLBLD CKD-EPI 2021: 23 ML/MIN/1.73M2 (ref 60–?)
FASTING STATUS PATIENT QL REPORTED: NO
GLOBULIN PLAS-MCNC: 3 G/DL (ref 2–3.5)
GLUCOSE BLD-MCNC: 95 MG/DL (ref 70–99)
GLUCOSE UR STRIP.AUTO-MCNC: NORMAL MG/DL
KETONES UR STRIP.AUTO-MCNC: NEGATIVE MG/DL
LEUKOCYTE ESTERASE UR QL STRIP.AUTO: 250
MICROALBUMIN UR-MCNC: 64.6 MG/DL
MICROALBUMIN/CREAT 24H UR-RTO: 1145.4 UG/MG (ref ?–30)
OSMOLALITY SERPL CALC.SUM OF ELEC: 311 MOSM/KG (ref 275–295)
PH UR STRIP.AUTO: 5.5 (ref 5–8)
POTASSIUM SERPL-SCNC: 5.5 MMOL/L (ref 3.5–5.1)
PROT SERPL-MCNC: 7.6 G/DL (ref 5.7–8.2)
PROT UR STRIP.AUTO-MCNC: 70 MG/DL
PROT UR-MCNC: 114.2 MG/DL (ref ?–14)
PROT/CREAT UR-RTO: 2.02
SODIUM SERPL-SCNC: 141 MMOL/L (ref 136–145)
SP GR UR STRIP.AUTO: 1.01 (ref 1–1.03)
UROBILINOGEN UR STRIP.AUTO-MCNC: NORMAL MG/DL

## 2025-08-26 PROCEDURE — 81001 URINALYSIS AUTO W/SCOPE: CPT

## 2025-08-26 PROCEDURE — 84156 ASSAY OF PROTEIN URINE: CPT

## 2025-08-26 PROCEDURE — 1160F RVW MEDS BY RX/DR IN RCRD: CPT | Performed by: INTERNAL MEDICINE

## 2025-08-26 PROCEDURE — 36415 COLL VENOUS BLD VENIPUNCTURE: CPT

## 2025-08-26 PROCEDURE — 80053 COMPREHEN METABOLIC PANEL: CPT

## 2025-08-26 PROCEDURE — 84165 PROTEIN E-PHORESIS SERUM: CPT

## 2025-08-26 PROCEDURE — 82570 ASSAY OF URINE CREATININE: CPT

## 2025-08-26 PROCEDURE — 82043 UR ALBUMIN QUANTITATIVE: CPT

## 2025-08-26 PROCEDURE — 86334 IMMUNOFIX E-PHORESIS SERUM: CPT

## 2025-08-26 PROCEDURE — 1159F MED LIST DOCD IN RCRD: CPT | Performed by: INTERNAL MEDICINE

## 2025-08-26 PROCEDURE — 3078F DIAST BP <80 MM HG: CPT | Performed by: INTERNAL MEDICINE

## 2025-08-26 PROCEDURE — 99205 OFFICE O/P NEW HI 60 MIN: CPT | Performed by: INTERNAL MEDICINE

## 2025-08-26 PROCEDURE — 83521 IG LIGHT CHAINS FREE EACH: CPT

## 2025-08-26 PROCEDURE — 3077F SYST BP >= 140 MM HG: CPT | Performed by: INTERNAL MEDICINE

## 2025-08-26 RX ORDER — SODIUM BICARBONATE 650 MG/1
650 TABLET ORAL 2 TIMES DAILY
Qty: 60 TABLET | Refills: 1 | Status: SHIPPED | OUTPATIENT
Start: 2025-08-26

## 2025-08-29 LAB
ALBUMIN SERPL ELPH-MCNC: 4.5 G/DL (ref 3.75–5.21)
ALBUMIN/GLOB SERPL: 1.6 (ref 1–2)
ALPHA1 GLOB SERPL ELPH-MCNC: 0.31 G/DL (ref 0.19–0.46)
ALPHA2 GLOB SERPL ELPH-MCNC: 0.55 G/DL (ref 0.48–1.05)
B-GLOBULIN SERPL ELPH-MCNC: 0.91 G/DL (ref 0.68–1.23)
GAMMA GLOB SERPL ELPH-MCNC: 1.04 G/DL (ref 0.62–1.7)
KAPPA LC FREE SER-MCNC: 8.09 MG/DL (ref 0.33–1.94)
KAPPA LC FREE/LAMBDA FREE SER NEPH: 1.77 (ref 0.26–1.65)
LAMBDA LC FREE SERPL-MCNC: 4.56 MG/DL (ref 0.57–2.63)
PROT SERPL-MCNC: 7.3 G/DL (ref 5.7–8.2)

## (undated) DEVICE — SUT VICRYL 3-0 SH J416H

## (undated) DEVICE — TIP COVER ACCESSORY

## (undated) DEVICE — VIOLET BRAIDED (POLYGLACTIN 910), SYNTHETIC ABSORBABLE SUTURE: Brand: COATED VICRYL

## (undated) DEVICE — AIRSEAL 12 MM ACCESS PORT AND PALM GRIP OBTURATOR WITH BLADELESS OPTICAL TIP, 120 MM LENGTH: Brand: AIRSEAL

## (undated) DEVICE — POLYURETHANE FEEDING TUBE,RADIOPAQUE LINE, SAFE ENTERAL CONNECTIONS: Brand: KANGAROO

## (undated) DEVICE — ENDOPATH ECHELON ENDOSCOPIC LINEAR CUTTER RELOADS, BLUE, 60MM: Brand: ECHELON ENDOPATH

## (undated) DEVICE — SURGICEL SNOW ABS 4X4

## (undated) DEVICE — EVACUATOR URO RELIVAC 100CC

## (undated) DEVICE — SUT ETHILON 2-0 FS 664H

## (undated) DEVICE — SOL H2O IV

## (undated) DEVICE — TRI-LUMEN FILTERED TUBE SET WITH ACTIVATED CHARCOAL FILTER: Brand: AIRSEAL

## (undated) DEVICE — ANCHOR TISSUE RETRIEVAL SYSTEM, BAG SIZE 175 ML, PORT SIZE 10 MM: Brand: ANCHOR TISSUE RETRIEVAL SYSTEM

## (undated) DEVICE — ROBOTIC UROLOGY PROSTATE: Brand: MEDLINE INDUSTRIES, INC.

## (undated) DEVICE — SUT PROLENE 4-0 RB-1 8557H

## (undated) DEVICE — GAUZE SPONGES,USP TYPE VII GAUZE, 12 PLY: Brand: CURITY

## (undated) DEVICE — VISUALIZATION SYSTEM: Brand: CLEARIFY

## (undated) DEVICE — SUT SILK 3-0 SH K832H

## (undated) DEVICE — COLUMN DRAPE

## (undated) DEVICE — CATH BARDEX IC FOLEY 16FR 5CC

## (undated) DEVICE — LARGE NEEDLE DRIVER: Brand: ENDOWRIST

## (undated) DEVICE — TISSUE RETRIEVAL SYSTEM: Brand: INZII RETRIEVAL SYSTEM

## (undated) DEVICE — ABSORBABLE WOUND CLOSURE DEVICE: Brand: V-LOC 90

## (undated) DEVICE — SUT SILK 0 A306H

## (undated) DEVICE — SUT VICRYL 4-0 RB-1 J304H

## (undated) DEVICE — CLOSURE EXOFIN 1.0ML

## (undated) DEVICE — SUT CHROMIC GUT 3-0 SH G122H

## (undated) DEVICE — PROGRASP FORCEPS: Brand: ENDOWRIST

## (undated) DEVICE — STERILE POLYISOPRENE POWDER-FREE SURGICAL GLOVES: Brand: PROTEXIS

## (undated) DEVICE — SUT VICRYL 0 UR-6 J603H

## (undated) DEVICE — 2-PIECE UROSTOMY POUCH: Brand: NEW IMAGE

## (undated) DEVICE — FENESTRATED BIPOLAR FORCEPS: Brand: ENDOWRIST

## (undated) DEVICE — UROSTOMY DRAIN TUBE ADAPTER: Brand: HOLLISTER

## (undated) DEVICE — 40580 - THE PINK PAD - ADVANCED TRENDELENBURG POSITIONING KIT: Brand: 40580 - THE PINK PAD - ADVANCED TRENDELENBURG POSITIONING KIT

## (undated) DEVICE — BLADELESS OBTURATOR: Brand: WECK VISTA

## (undated) DEVICE — DRAIN ROUND HUBLESS 15FR

## (undated) DEVICE — DRAPE,TOP,102X53,STERILE: Brand: MEDLINE

## (undated) DEVICE — SLEEVE KENDALL SCD EXPRESS MED

## (undated) DEVICE — ECHELON FLEX POWERED PLUS ARTICULATING ENDOSCOPIC LINEAR CUTTER , 60MM: Brand: ECHELON FLEX

## (undated) DEVICE — MONOPOLAR CURVED SCISSORS: Brand: ENDOWRIST

## (undated) DEVICE — CANNULA SEAL

## (undated) DEVICE — SUT MONOCRYL 4-0 PS-2 Y496G

## (undated) DEVICE — ARM DRAPE

## (undated) DEVICE — SUTURE VLOC 90 2-0 9" 2145

## (undated) DEVICE — ENDOPATH ECHELON ENDOSCOPIC LINEAR CUTTER RELOADS, WHITE, 60MM: Brand: ECHELON ENDOPATH

## (undated) NOTE — LETTER
[facility] [address], [address]  Consent for Procedure/Sedation    Date: 8/8/22    Time: 1630      1. I hereby authorize Dr. Patience Ramsey, my physician and his/her assistants (if applicable), which may include medical students, residents, and/or fellows, to perform the following surgical operation/ procedure and administer such anesthesia as may be determined necessary by my physician:  Operation/Procedure name (s) Left nephrostomy tube check and/or exchange on Mac Cordon. 2.   I recognize that during the surgical operation/procedure, unforeseen conditions may necessitate additional or different procedures than those listed above. I, therefore, further authorize and request that the above-named surgeon, assistants, or designees perform such procedures as are, in their judgment, necessary and desirable. 3.   My surgeon/physician has discussed prior to my surgery the potential benefits, risks and side effects of this procedure; the likelihood of achieving goals; and potential problems that might occur during recuperation. They also discussed reasonable alternatives to the procedure, including risks, benefits, and side effects related to the alternatives and risks related to not receiving this procedure. I have had all my questions answered and I acknowledge that no guarantee has been made as to the result that may be obtained. 4.   Should the need arise during my operation or immediate post-operative period, I also consent to the administration of blood and/or blood products. Further, I understand that despite careful testing and screening of blood or blood products by collecting agencies, I may still be subject to ill effects as a result of receiving a blood transfusion and/or blood products.   The following are some, but not all, of the potential risks that can occur: fever and allergic reactions, hemolytic reactions, transmission of diseases such as Hepatitis, AIDS and Cytomegalovirus (CMV) and fluid overload. In the event that I wish to have an autologous transfusion of my own blood, or a directed donor transfusion. I will discuss this with my physician. 5.   I authorize the use of any specimen, organs, tissues, body parts or foreign objects that may be removed from my body during the operation/procedure for diagnosis, research or teaching purposes and their subsequent disposal by hospital authorities. I also authorize the release of specimen test results and/or written reports to my treating physician on the hospital medical staff or other referring or consulting physicians involved in my care, at the discretion of the Pathologist or my treating physician. 6.   I consent to the photographing or videotaping of the operations or procedures to be performed, including appropriate portions of my body for medical, scientific, or educational purposes, provided my identity is not revealed by the pictures or by descriptive texts accompanying them. If the procedure has been photographed/videotaped, the surgeon will obtain the original picture, image, videotape or CD. The hospital will not be responsible for storage, release or maintenance of the picture, image, tape or CD.    7.   I consent to the presence of a  or observers in the operating room as deemed necessary by my physician or their designees. 8.   I recognize that in the event my procedure results in extended X-Ray/fluoroscopy time, I may develop a skin reaction. 9. If I have a Do Not Attempt Resuscitation (DNAR) order in place, that status will be suspended while in the operating room, procedural suite, and during the recovery period unless otherwise explicitly stated by me (or a person authorized to consent on my behalf). The surgeon or my attending physician will determine when the applicable recovery period ends for purposes of reinstating the DNAR order.   10. Patients having a sterilization procedure: I understand that if the procedure is successful the results will be permanent and it will therefore be impossible for me to inseminate, conceive, or bear children. I also understand that the procedure is intended to result in sterility, although the result has not been guaranteed. 11. I acknowledge that my physician has explained sedation/analgesia administration to me including the risk and benefits I consent to the administration of sedation/analgesia as may be necessary or desirable in the judgment of my physician.     I CERTIFY THAT I HAVE READ AND FULLY UNDERSTAND THE ABOVE CONSENT TO OPERATION and/or OTHER PROCEDURE.      _________________________________________  __________________________________  Signature of Patient     Signature of Responsible Person         ___________________________________         Printed Name of Responsible Person           _________________________________                 Relationship to Patient  _________________________________________  ______________________________  Signature of Witness          Date  Time      Patient Name: Mac Callejas     : 1945                 Printed: 2022     Medical Record #: [id number]                     Page 1

## (undated) NOTE — LETTER
[address] [facility] FOR BLOOD OR BLOOD COMPONENTS      In the course of your treatment, it may become necessary to administer a transfusion of blood or blood components. This form provides basic information concerning this procedure and, if signed by you, authorizes its performance by qualified medical personnel. DESCRIPTION OF PROCEDURE:  Blood is introduced into one of your veins, commonly in the arm, using a sterilized disposable needle. The amount of blood transfused, and whether the transfusion will be of blood or blood components is a judgment the physician will make based on your particular needs. RISKS:  The transfusion is a common procedure of low risk. MINOR AND TEMPORARY REACTIONS ARE NOT UNCOMMON, including a slight bruise, swelling or local reaction in the area where the needle pierces your skin, or a non-serious reaction to the transfused material itself, including headache, fever or a mild skin reaction, such as rash. Serious reactions are possible, though very unlikely and include severe allergic reaction (shock)  and destruction (hemolysis) of transfused blood cells. Infectious diseases which are known to be transmitted by blood transfusion include CERTAIN TYPES OF VIRAL HEPATITIS, a viral infection of the liver, HUMAN IMMUNODEFICIENCY VIRUS (HIV-1,2) infection, a viral infection known to cause ACQUIRED IMMUNODEFICIENCY SYNDROME (AIDS) AS WELL AS CERTAIN OTHER BACTERIAL, VIRAL AND PARASITIC DISEASES. While a minimal risk of acquiring an infectious disease from transfused blood exists, in accordance with Federal and State law all due care has been taken in donor selection and testing to avoid transmission of disease. ALTERNATIVES:  If loss of blood poses serious threats in the course of your treatment, THERE IS NO EFFECTIVE ALTERNATIVE TO BLOOD TRANSFUSION.  However, if you have any further questions on this matter, your physician will fully explain the alternatives to you if it has not already been done. Charlene Terry, have read/had read to me the above. I understand the matters bearing on the decision whether or not to authorize a transfusion of blood or blood components. I have no questions which have not been answered to my full satisfaction.  I hereby consent to such transfusion as  my physician may deem necessary or advisable in the course of my treatment.        _______________   __________________________________________________  Date     Signature of Patient, Parent or Legal Guardian      ([place])      __________________________________________  Witness to Signature (title or relationship to patient)    Patient Name: Shaniqua Geneva     : 1945                 Printed: 2022     Medical Record #: [id number]                    Page 1 of 1

## (undated) NOTE — LETTER
[address] [facility] [place]LOOD COMPONENTS      In the course of your treatment, it may become necessary to administer a transfusion of blood or blood components. This form provides basic information concerning this procedure and, if signed by you, authorizes its performance by qualified medical personnel. DESCRIPTION OF PROCEDURE:  Blood is introduced into one of your veins, commonly in the arm, using a sterilized disposable needle. The amount of blood transfused, and whether the transfusion will be of blood or blood components is a judgment the physician will make based on your particular needs. RISKS:  The transfusion is a common procedure of low risk. MINOR AND TEMPORARY REACTIONS ARE NOT UNCOMMON, including a slight bruise, swelling or local reaction in the area where the needle pierces your skin, or a non-serious reaction to the transfused material itself, including headache, fever or a mild skin reaction, such as rash. Serious reactions are possible, though very unlikely and include severe allergic reaction (shock)  and destruction (hemolysis) of transfused blood cells. Infectious diseases which are known to be transmitted by blood transfusion include CERTAIN TYPES OF VIRAL HEPATITIS, a viral infection of the liver, HUMAN IMMUNODEFICIENCY VIRUS (HIV-1,2) infection, a viral infection known to cause ACQUIRED IMMUNODEFICIENCY SYNDROME (AIDS) AS WELL AS CERTAIN OTHER BACTERIAL, VIRAL AND PARASITIC DISEASES. While a minimal risk of acquiring an infectious disease from transfused blood exists, in accordance with Federal and State law all due care has been taken in donor selection and testing to avoid transmission of disease. ALTERNATIVES:  If loss of blood poses serious threats in the course of your treatment, THERE IS NO EFFECTIVE ALTERNATIVE TO BLOOD TRANSFUSION.  However, if you have any further questions on this matter, your physician will fully explain the alternatives to you if it has not already been done. Antonio Macielocent, have read/had read to me the above. I understand the matters bearing on the decision whether or not to authorize a transfusion of blood or blood components. I have no questions which have not been answered to my full satisfaction.  I hereby consent to such transfusion as  my physician may deem necessary or advisable in the course of my treatment.        _______________   __________________________________________________  Date     Signature of Patient, Parent or Legal Guardian      ([place])      __________________________________________  Witness to Signature (title or relationship to patient)    Patient Name: Mac Callejas     : 1945                 Printed: 2022     Medical Record #: [id number]                    Page 1 of 1

## (undated) NOTE — LETTER
[facility] [address], [address]  Consent for Procedure/Sedation    Date: 8/8/22    Time: 4469      1. I hereby authorize Dr. Yolette Gill, my physician and his/her assistants (if applicable), which may include medical students, residents, and/or fellows, to perform the following surgical operation/ procedure and administer such anesthesia as may be determined necessary by my physician:  Operation/Procedure name (s) Left Nephrostomy tube check on [address]  2. I recognize that during the surgical operation/procedure, unforeseen conditions may necessitate additional or different procedures than those listed above. I, therefore, further authorize and request that the above-named surgeon, assistants, or designees perform such procedures as are, in their judgment, necessary and desirable. 3.   My surgeon/physician has discussed prior to my surgery the potential benefits, risks and side effects of this procedure; the likelihood of achieving goals; and potential problems that might occur during recuperation. They also discussed reasonable alternatives to the procedure, including risks, benefits, and side effects related to the alternatives and risks related to not receiving this procedure. I have had all my questions answered and I acknowledge that no guarantee has been made as to the result that may be obtained. 4.   Should the need arise during my operation or immediate post-operative period, I also consent to the administration of blood and/or blood products. Further, I understand that despite careful testing and screening of blood or blood products by collecting agencies, I may still be subject to ill effects as a result of receiving a blood transfusion and/or blood products.   The following are some, but not all, of the potential risks that can occur: fever and allergic reactions, hemolytic reactions, transmission of diseases such as Hepatitis, AIDS and Cytomegalovirus (CMV) and fluid overload. In the event that I wish to have an autologous transfusion of my own blood, or a directed donor transfusion. I will discuss this with my physician. 5.   I authorize the use of any specimen, organs, tissues, body parts or foreign objects that may be removed from my body during the operation/procedure for diagnosis, research or teaching purposes and their subsequent disposal by hospital authorities. I also authorize the release of specimen test results and/or written reports to my treating physician on the hospital medical staff or other referring or consulting physicians involved in my care, at the discretion of the Pathologist or my treating physician. 6.   I consent to the photographing or videotaping of the operations or procedures to be performed, including appropriate portions of my body for medical, scientific, or educational purposes, provided my identity is not revealed by the pictures or by descriptive texts accompanying them. If the procedure has been photographed/videotaped, the surgeon will obtain the original picture, image, videotape or CD. The hospital will not be responsible for storage, release or maintenance of the picture, image, tape or CD.    7.   I consent to the presence of a  or observers in the operating room as deemed necessary by my physician or their designees. 8.   I recognize that in the event my procedure results in extended X-Ray/fluoroscopy time, I may develop a skin reaction. 9. If I have a Do Not Attempt Resuscitation (DNAR) order in place, that status will be suspended while in the operating room, procedural suite, and during the recovery period unless otherwise explicitly stated by me (or a person authorized to consent on my behalf). The surgeon or my attending physician will determine when the applicable recovery period ends for purposes of reinstating the DNAR order.   10. Patients having a sterilization procedure: I understand that if the procedure is successful the results will be permanent and it will therefore be impossible for me to inseminate, conceive, or bear children. I also understand that the procedure is intended to result in sterility, although the result has not been guaranteed. 11. I acknowledge that my physician has explained sedation/analgesia administration to me including the risk and benefits I consent to the administration of sedation/analgesia as may be necessary or desirable in the judgment of my physician.     I CERTIFY THAT I HAVE READ AND FULLY UNDERSTAND THE ABOVE CONSENT TO OPERATION and/or OTHER PROCEDURE.      _________________________________________  __________________________________  Signature of Patient     Signature of Responsible Person         ___________________________________         Printed Name of Responsible Person           _________________________________                 Relationship to Patient  _________________________________________  ______________________________  Signature of Witness          Date  Time      Patient Name: Placido Lundy     : 1945                 Printed: 2022     Medical Record #: [id number]                     Page